# Patient Record
Sex: FEMALE | Race: WHITE | NOT HISPANIC OR LATINO | Employment: OTHER | URBAN - METROPOLITAN AREA
[De-identification: names, ages, dates, MRNs, and addresses within clinical notes are randomized per-mention and may not be internally consistent; named-entity substitution may affect disease eponyms.]

---

## 2022-12-27 ENCOUNTER — APPOINTMENT (OUTPATIENT)
Dept: GENERAL RADIOLOGY | Facility: HOSPITAL | Age: 87
End: 2022-12-27
Payer: MEDICARE

## 2022-12-27 ENCOUNTER — HOSPITAL ENCOUNTER (OUTPATIENT)
Facility: HOSPITAL | Age: 87
Setting detail: OBSERVATION
Discharge: NURSING, REHAB OR LONG-TERM CARE | End: 2023-01-03
Attending: EMERGENCY MEDICINE | Admitting: INTERNAL MEDICINE
Payer: MEDICARE

## 2022-12-27 ENCOUNTER — APPOINTMENT (OUTPATIENT)
Dept: CT IMAGING | Facility: HOSPITAL | Age: 87
End: 2022-12-27
Payer: MEDICARE

## 2022-12-27 DIAGNOSIS — R73.09 ELEVATED GLUCOSE: ICD-10-CM

## 2022-12-27 DIAGNOSIS — E83.52 HYPERCALCEMIA: ICD-10-CM

## 2022-12-27 DIAGNOSIS — E86.0 DEHYDRATION: ICD-10-CM

## 2022-12-27 DIAGNOSIS — R82.90 ABNORMAL URINE FINDING: ICD-10-CM

## 2022-12-27 DIAGNOSIS — Z74.09 DECREASED AMBULATION STATUS: Primary | ICD-10-CM

## 2022-12-27 DIAGNOSIS — R79.89 ELEVATED TSH: ICD-10-CM

## 2022-12-27 DIAGNOSIS — R79.89 LFT ELEVATION: ICD-10-CM

## 2022-12-27 DIAGNOSIS — R53.1 WEAKNESS: ICD-10-CM

## 2022-12-27 DIAGNOSIS — L89.302 PRESSURE INJURY OF BUTTOCK, STAGE 2, UNSPECIFIED LATERALITY: ICD-10-CM

## 2022-12-27 DIAGNOSIS — M62.82 NON-TRAUMATIC RHABDOMYOLYSIS: ICD-10-CM

## 2022-12-27 LAB
ALBUMIN SERPL-MCNC: 4.2 G/DL (ref 3.5–5.2)
ALBUMIN/GLOB SERPL: 1.4 G/DL
ALP SERPL-CCNC: 199 U/L (ref 39–117)
ALT SERPL W P-5'-P-CCNC: 143 U/L (ref 1–33)
ANION GAP SERPL CALCULATED.3IONS-SCNC: 14 MMOL/L (ref 5–15)
AST SERPL-CCNC: 119 U/L (ref 1–32)
BACTERIA UR QL AUTO: ABNORMAL /HPF
BASOPHILS # BLD AUTO: 0.02 10*3/MM3 (ref 0–0.2)
BASOPHILS NFR BLD AUTO: 0.2 % (ref 0–1.5)
BILIRUB SERPL-MCNC: 1.1 MG/DL (ref 0–1.2)
BILIRUB UR QL STRIP: ABNORMAL
BUN SERPL-MCNC: 24 MG/DL (ref 8–23)
BUN/CREAT SERPL: 24.5 (ref 7–25)
CALCIUM SPEC-SCNC: 9.9 MG/DL (ref 8.2–9.6)
CHLORIDE SERPL-SCNC: 103 MMOL/L (ref 98–107)
CK SERPL-CCNC: 614 U/L (ref 20–180)
CLARITY UR: ABNORMAL
CO2 SERPL-SCNC: 20 MMOL/L (ref 22–29)
COLOR UR: ABNORMAL
CREAT SERPL-MCNC: 0.98 MG/DL (ref 0.57–1)
CRP SERPL-MCNC: 3.31 MG/DL (ref 0–0.5)
D-LACTATE SERPL-SCNC: 1.8 MMOL/L (ref 0.5–2)
DEPRECATED RDW RBC AUTO: 45.5 FL (ref 37–54)
EGFRCR SERPLBLD CKD-EPI 2021: 51.6 ML/MIN/1.73
EOSINOPHIL # BLD AUTO: 0 10*3/MM3 (ref 0–0.4)
EOSINOPHIL NFR BLD AUTO: 0 % (ref 0.3–6.2)
ERYTHROCYTE [DISTWIDTH] IN BLOOD BY AUTOMATED COUNT: 13.4 % (ref 12.3–15.4)
ERYTHROCYTE [SEDIMENTATION RATE] IN BLOOD: 46 MM/HR (ref 0–30)
FLUAV SUBTYP SPEC NAA+PROBE: NOT DETECTED
FLUBV RNA ISLT QL NAA+PROBE: NOT DETECTED
GLOBULIN UR ELPH-MCNC: 2.9 GM/DL
GLUCOSE SERPL-MCNC: 150 MG/DL (ref 65–99)
GLUCOSE UR STRIP-MCNC: NEGATIVE MG/DL
HCT VFR BLD AUTO: 40.6 % (ref 34–46.6)
HGB BLD-MCNC: 13.7 G/DL (ref 12–15.9)
HGB UR QL STRIP.AUTO: NEGATIVE
HOLD SPECIMEN: NORMAL
HYALINE CASTS UR QL AUTO: ABNORMAL /LPF
IMM GRANULOCYTES # BLD AUTO: 0.05 10*3/MM3 (ref 0–0.05)
IMM GRANULOCYTES NFR BLD AUTO: 0.5 % (ref 0–0.5)
KETONES UR QL STRIP: ABNORMAL
LEUKOCYTE ESTERASE UR QL STRIP.AUTO: ABNORMAL
LYMPHOCYTES # BLD AUTO: 0.7 10*3/MM3 (ref 0.7–3.1)
LYMPHOCYTES NFR BLD AUTO: 7.2 % (ref 19.6–45.3)
MAGNESIUM SERPL-MCNC: 2 MG/DL (ref 1.7–2.3)
MCH RBC QN AUTO: 31.4 PG (ref 26.6–33)
MCHC RBC AUTO-ENTMCNC: 33.7 G/DL (ref 31.5–35.7)
MCV RBC AUTO: 92.9 FL (ref 79–97)
MONOCYTES # BLD AUTO: 0.82 10*3/MM3 (ref 0.1–0.9)
MONOCYTES NFR BLD AUTO: 8.4 % (ref 5–12)
NEUTROPHILS NFR BLD AUTO: 8.14 10*3/MM3 (ref 1.7–7)
NEUTROPHILS NFR BLD AUTO: 83.7 % (ref 42.7–76)
NITRITE UR QL STRIP: NEGATIVE
NRBC BLD AUTO-RTO: 0 /100 WBC (ref 0–0.2)
PH UR STRIP.AUTO: <=5 [PH] (ref 5–8)
PLATELET # BLD AUTO: 235 10*3/MM3 (ref 140–450)
PMV BLD AUTO: 10.4 FL (ref 6–12)
POTASSIUM SERPL-SCNC: 3.6 MMOL/L (ref 3.5–5.2)
PROT SERPL-MCNC: 7.1 G/DL (ref 6–8.5)
PROT UR QL STRIP: ABNORMAL
QT INTERVAL: 438 MS
QTC INTERVAL: 475 MS
RBC # BLD AUTO: 4.37 10*6/MM3 (ref 3.77–5.28)
RBC # UR STRIP: ABNORMAL /HPF
REF LAB TEST METHOD: ABNORMAL
SARS-COV-2 RNA PNL SPEC NAA+PROBE: NOT DETECTED
SODIUM SERPL-SCNC: 137 MMOL/L (ref 136–145)
SP GR UR STRIP: 1.02 (ref 1–1.03)
SQUAMOUS #/AREA URNS HPF: ABNORMAL /HPF
T4 FREE SERPL-MCNC: 0.88 NG/DL (ref 0.93–1.7)
TROPONIN T SERPL-MCNC: <0.01 NG/ML (ref 0–0.03)
TSH SERPL DL<=0.05 MIU/L-ACNC: 5.7 UIU/ML (ref 0.27–4.2)
UROBILINOGEN UR QL STRIP: ABNORMAL
VIT B12 BLD-MCNC: 685 PG/ML (ref 211–946)
WBC # UR STRIP: ABNORMAL /HPF
WBC NRBC COR # BLD: 9.73 10*3/MM3 (ref 3.4–10.8)
WHOLE BLOOD HOLD COAG: NORMAL
WHOLE BLOOD HOLD SPECIMEN: NORMAL
YEAST URNS QL MICRO: ABNORMAL /HPF

## 2022-12-27 PROCEDURE — G0378 HOSPITAL OBSERVATION PER HR: HCPCS

## 2022-12-27 PROCEDURE — 87086 URINE CULTURE/COLONY COUNT: CPT | Performed by: INTERNAL MEDICINE

## 2022-12-27 PROCEDURE — 93005 ELECTROCARDIOGRAM TRACING: CPT

## 2022-12-27 PROCEDURE — 84439 ASSAY OF FREE THYROXINE: CPT | Performed by: INTERNAL MEDICINE

## 2022-12-27 PROCEDURE — 85025 COMPLETE CBC W/AUTO DIFF WBC: CPT | Performed by: EMERGENCY MEDICINE

## 2022-12-27 PROCEDURE — 96365 THER/PROPH/DIAG IV INF INIT: CPT

## 2022-12-27 PROCEDURE — 82607 VITAMIN B-12: CPT | Performed by: INTERNAL MEDICINE

## 2022-12-27 PROCEDURE — 87040 BLOOD CULTURE FOR BACTERIA: CPT | Performed by: INTERNAL MEDICINE

## 2022-12-27 PROCEDURE — 25010000002 CEFTRIAXONE PER 250 MG: Performed by: INTERNAL MEDICINE

## 2022-12-27 PROCEDURE — 99220 PR INITIAL OBSERVATION CARE/DAY 70 MINUTES: CPT | Performed by: INTERNAL MEDICINE

## 2022-12-27 PROCEDURE — 71045 X-RAY EXAM CHEST 1 VIEW: CPT

## 2022-12-27 PROCEDURE — C9803 HOPD COVID-19 SPEC COLLECT: HCPCS

## 2022-12-27 PROCEDURE — 99285 EMERGENCY DEPT VISIT HI MDM: CPT

## 2022-12-27 PROCEDURE — 87636 SARSCOV2 & INF A&B AMP PRB: CPT | Performed by: EMERGENCY MEDICINE

## 2022-12-27 PROCEDURE — 84484 ASSAY OF TROPONIN QUANT: CPT | Performed by: EMERGENCY MEDICINE

## 2022-12-27 PROCEDURE — 83605 ASSAY OF LACTIC ACID: CPT | Performed by: INTERNAL MEDICINE

## 2022-12-27 PROCEDURE — 93005 ELECTROCARDIOGRAM TRACING: CPT | Performed by: EMERGENCY MEDICINE

## 2022-12-27 PROCEDURE — 96372 THER/PROPH/DIAG INJ SC/IM: CPT

## 2022-12-27 PROCEDURE — 84443 ASSAY THYROID STIM HORMONE: CPT | Performed by: EMERGENCY MEDICINE

## 2022-12-27 PROCEDURE — 86140 C-REACTIVE PROTEIN: CPT | Performed by: INTERNAL MEDICINE

## 2022-12-27 PROCEDURE — 81001 URINALYSIS AUTO W/SCOPE: CPT | Performed by: EMERGENCY MEDICINE

## 2022-12-27 PROCEDURE — 82550 ASSAY OF CK (CPK): CPT | Performed by: EMERGENCY MEDICINE

## 2022-12-27 PROCEDURE — 83735 ASSAY OF MAGNESIUM: CPT | Performed by: EMERGENCY MEDICINE

## 2022-12-27 PROCEDURE — 87186 SC STD MICRODIL/AGAR DIL: CPT | Performed by: INTERNAL MEDICINE

## 2022-12-27 PROCEDURE — 87077 CULTURE AEROBIC IDENTIFY: CPT | Performed by: INTERNAL MEDICINE

## 2022-12-27 PROCEDURE — 80053 COMPREHEN METABOLIC PANEL: CPT | Performed by: EMERGENCY MEDICINE

## 2022-12-27 PROCEDURE — 85652 RBC SED RATE AUTOMATED: CPT | Performed by: INTERNAL MEDICINE

## 2022-12-27 PROCEDURE — 25010000002 ENOXAPARIN PER 10 MG: Performed by: INTERNAL MEDICINE

## 2022-12-27 RX ORDER — BISACODYL 5 MG/1
5 TABLET, DELAYED RELEASE ORAL DAILY PRN
Status: DISCONTINUED | OUTPATIENT
Start: 2022-12-27 | End: 2023-01-03 | Stop reason: HOSPADM

## 2022-12-27 RX ORDER — SODIUM CHLORIDE 9 MG/ML
75 INJECTION, SOLUTION INTRAVENOUS CONTINUOUS
Status: ACTIVE | OUTPATIENT
Start: 2022-12-27 | End: 2022-12-28

## 2022-12-27 RX ORDER — DORZOLAMIDE HYDROCHLORIDE AND TIMOLOL MALEATE 20; 5 MG/ML; MG/ML
1 SOLUTION/ DROPS OPHTHALMIC 2 TIMES DAILY
COMMUNITY

## 2022-12-27 RX ORDER — SODIUM CHLORIDE 0.9 % (FLUSH) 0.9 %
10 SYRINGE (ML) INJECTION AS NEEDED
Status: DISCONTINUED | OUTPATIENT
Start: 2022-12-27 | End: 2023-01-03 | Stop reason: HOSPADM

## 2022-12-27 RX ORDER — ACETAMINOPHEN 650 MG/1
650 SUPPOSITORY RECTAL EVERY 4 HOURS PRN
Status: DISCONTINUED | OUTPATIENT
Start: 2022-12-27 | End: 2023-01-03 | Stop reason: HOSPADM

## 2022-12-27 RX ORDER — FLUPHENAZINE HYDROCHLORIDE 5 MG/1
2.5 TABLET ORAL DAILY
COMMUNITY

## 2022-12-27 RX ORDER — SODIUM CHLORIDE 0.9 % (FLUSH) 0.9 %
10 SYRINGE (ML) INJECTION EVERY 12 HOURS SCHEDULED
Status: DISCONTINUED | OUTPATIENT
Start: 2022-12-27 | End: 2023-01-03 | Stop reason: HOSPADM

## 2022-12-27 RX ORDER — SODIUM CHLORIDE 9 MG/ML
125 INJECTION, SOLUTION INTRAVENOUS CONTINUOUS
Status: DISCONTINUED | OUTPATIENT
Start: 2022-12-27 | End: 2022-12-27

## 2022-12-27 RX ORDER — GABAPENTIN 300 MG/1
300 CAPSULE ORAL 3 TIMES DAILY
COMMUNITY

## 2022-12-27 RX ORDER — BISACODYL 10 MG
10 SUPPOSITORY, RECTAL RECTAL DAILY PRN
Status: DISCONTINUED | OUTPATIENT
Start: 2022-12-27 | End: 2023-01-03 | Stop reason: HOSPADM

## 2022-12-27 RX ORDER — SPIRONOLACTONE 25 MG/1
25 TABLET ORAL DAILY
Status: ON HOLD | COMMUNITY
End: 2023-01-03 | Stop reason: SDUPTHER

## 2022-12-27 RX ORDER — ENOXAPARIN SODIUM 100 MG/ML
40 INJECTION SUBCUTANEOUS DAILY
Status: DISCONTINUED | OUTPATIENT
Start: 2022-12-27 | End: 2022-12-28

## 2022-12-27 RX ORDER — ACETAMINOPHEN 325 MG/1
650 TABLET ORAL EVERY 4 HOURS PRN
Status: DISCONTINUED | OUTPATIENT
Start: 2022-12-27 | End: 2023-01-03 | Stop reason: HOSPADM

## 2022-12-27 RX ORDER — PROPYLTHIOURACIL 50 MG/1
50 TABLET ORAL DAILY
COMMUNITY

## 2022-12-27 RX ORDER — ONDANSETRON 2 MG/ML
4 INJECTION INTRAMUSCULAR; INTRAVENOUS EVERY 6 HOURS PRN
Status: DISCONTINUED | OUTPATIENT
Start: 2022-12-27 | End: 2023-01-03 | Stop reason: HOSPADM

## 2022-12-27 RX ORDER — POLYETHYLENE GLYCOL 3350 17 G/17G
17 POWDER, FOR SOLUTION ORAL DAILY PRN
Status: DISCONTINUED | OUTPATIENT
Start: 2022-12-27 | End: 2023-01-03 | Stop reason: HOSPADM

## 2022-12-27 RX ORDER — SODIUM CHLORIDE 9 MG/ML
40 INJECTION, SOLUTION INTRAVENOUS AS NEEDED
Status: DISCONTINUED | OUTPATIENT
Start: 2022-12-27 | End: 2023-01-03 | Stop reason: HOSPADM

## 2022-12-27 RX ORDER — METOPROLOL TARTRATE 100 MG/1
100 TABLET ORAL 2 TIMES DAILY
COMMUNITY

## 2022-12-27 RX ORDER — LISINOPRIL 40 MG/1
40 TABLET ORAL 2 TIMES DAILY
COMMUNITY

## 2022-12-27 RX ORDER — AMOXICILLIN 250 MG
2 CAPSULE ORAL 2 TIMES DAILY
Status: DISCONTINUED | OUTPATIENT
Start: 2022-12-27 | End: 2023-01-03 | Stop reason: HOSPADM

## 2022-12-27 RX ADMIN — ACETAMINOPHEN 650 MG: 325 TABLET ORAL at 15:11

## 2022-12-27 RX ADMIN — SODIUM CHLORIDE 125 ML/HR: 9 INJECTION, SOLUTION INTRAVENOUS at 11:07

## 2022-12-27 RX ADMIN — Medication 10 ML: at 16:26

## 2022-12-27 RX ADMIN — SODIUM CHLORIDE 500 ML: 9 INJECTION, SOLUTION INTRAVENOUS at 11:07

## 2022-12-27 RX ADMIN — SODIUM CHLORIDE 75 ML/HR: 9 INJECTION, SOLUTION INTRAVENOUS at 16:26

## 2022-12-27 RX ADMIN — CEFTRIAXONE 2 G: 2 INJECTION, POWDER, FOR SOLUTION INTRAMUSCULAR; INTRAVENOUS at 16:00

## 2022-12-27 RX ADMIN — ENOXAPARIN SODIUM 40 MG: 40 INJECTION SUBCUTANEOUS at 18:05

## 2022-12-27 NOTE — H&P
[facility] Medicine Services  HISTORY AND PHYSICAL    Patient Name: Corie Kessler  : 1922  MRN: [id number]  Primary Care Physician: Sweta Nagel MD  Date of admission: 2022      Subjective   Subjective     Chief Complaint:  Weakness    HPI:  Corie Kessler is a 89 y.o. female with past medical history of essential hypertension, not on medications at home, who lives alone and pretty much independent with her ADLs, using a walker to ambulate and lives close to her daughter who presented to the hospital brought by EMS for extreme weakness.    Around 5 PM yesterday, she went to use the toilet and after she finished urinating, she could not get up because of extreme weakness in her lower extremities.  She tried multiple times but could not stand up.  She did not want to use her [employer] button because she did not want her daughter to get out and come in the cold weather so she spent the whole night sitting in the toilet and eventually pressed her button in the morning.    She cannot tell me if she has dysuria but she does have urgency.  Denies any fever chills.  Denies any cough.  Denies any chest pain    In ER, blood work-up was unremarkable.  Vital stables.  Urine analysis positive for bacteriuria    Review of Systems   General : no fevers, chills,++ weakness  CVS: No chest pain, palpitations  Respiratory: No cough, dyspnea  GI: No N/V/D, abd pain  10 point review of systems is negative except for what is mentioned in HPI    Personal History     Past Medical History:   Diagnosis Date   • Hypertension              History reviewed. No pertinent surgical history.    Family History: Reviewed and found to be noncontributory to the acute presenting illness      Social History:  reports that she has never smoked. She does not have any smokeless tobacco history on file. She reports that she does not drink alcohol and does not use drugs.  Social History     Social History Narrative    • Not on file       Medications:  Available home medication information reviewed.  (Not in a hospital admission)      No Known Allergies    Objective   Objective     Vital Signs:   Temp:  [97.9 °F (36.6 °C)] 97.9 °F (36.6 °C)  Heart Rate:  [66-73] 66  Resp:  [16] 16  BP: (105-125)/(44-59) 109/46       Physical Exam   General: Chronically ill looking, not in distress, conversant and cooperative  Head: Atraumatic and normocephalic  Eyes: No Icterus. No pallor  Ears:  Ears appear intact with no abnormalities noted  Throat: No oral lesions, no thrush  Neck: Supple, trachea midline  Lungs: Clear to auscultation bilaterally, equal air entry, no wheezing or crackles  Heart:  Normal S1 and S2, no murmur, no gallop, No JVD, no lower extremity swelling  Abdomen:  Soft, no tenderness, no organomegaly, normal bowel sounds, no organomegaly  Extremities: pulses equal bilaterally  Skin: No bleeding, bruising or rash, normal skin turgor and elasticity  Neurologic: Cranial nerves appear intact with no evidence of facial asymmetry, normal motor and sensory functions in all 4 extremities  Psych: Alert and oriented x 3, normal mood    Result Review:  I have personally reviewed the results from the time of this admission to 12/27/2022 12:19 EST and agree with these findings:  [x]  Laboratory list / accordion  [x]  Microbiology  [x]  Radiology  []  EKG/Telemetry   []  Cardiology/Vascular   []  Pathology  []  Old records      LAB RESULTS:      Lab 12/27/22  1031   WBC 9.73   HEMOGLOBIN 13.7   HEMATOCRIT 40.6   PLATELETS 235   NEUTROS ABS 8.14*   IMMATURE GRANS (ABS) 0.05   LYMPHS ABS 0.70   MONOS ABS 0.82   EOS ABS 0.00   MCV 92.9         Lab 12/27/22  1031   SODIUM 137   POTASSIUM 3.6   CHLORIDE 103   CO2 20.0*   ANION GAP 14.0   BUN 24*   CREATININE 0.98   EGFR 51.6*   GLUCOSE 150*   CALCIUM 9.9*   MAGNESIUM 2.0   TSH 5.700*         Lab 12/27/22  1031   TOTAL PROTEIN 7.1   ALBUMIN 4.2   GLOBULIN 2.9   ALT (SGPT) 143*   AST (SGOT) 119*    BILIRUBIN 1.1   ALK PHOS 199*         Lab 12/27/22  1031   TROPONIN T <0.010                 UA    Urinalysis 12/27/22 12/27/22    1103 1103   Squamous Epithelial Cells, UA  0-2   Specific Gravity, UA 1.020    Ketones, UA Trace (A)    Blood, UA Negative    Leukocytes, UA Small (1+) (A)    Nitrite, UA Negative    RBC, UA  7-12 (A)   WBC, UA  6-12 (A)   Bacteria, UA  4+ (A)   (A) Abnormal value              Microbiology Results (last 10 days)     Procedure Component Value - Date/Time    COVID-19 and FLU A/B PCR - Swab, Nasopharynx [[id number]]  (Normal) Collected: 12/27/22 1107    Lab Status: Final result Specimen: Swab from Nasopharynx Updated: 12/27/22 1207     COVID19 Not Detected     Influenza A PCR Not Detected     Influenza B PCR Not Detected    Narrative:      Fact sheet for providers: https://www.fda.gov/media/834340/download    Fact sheet for patients: https://www.fda.gov/media/058829/download    Test performed by PCR.          XR Chest 1 View    Result Date: 12/27/2022  DATE OF EXAM: 12/27/2022 11:09 AM  PROCEDURE: XR CHEST 1 VW-  INDICATIONS: Weak/Dizzy/AMS triage protocol  COMPARISON: 3/30/2010 portable chest radiograph  TECHNIQUE: Single radiographic AP view of the chest was obtained.  FINDINGS: Heart is normal in size. Vasculature appears normal. Mild coarsening of pulmonary interstitial markings may be chronic senescent change, given patient's age. There may be a small focus of atelectasis in the right lung base but no other potential focal lung disease is seen. No effusion or pneumothorax is seen. 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 No acute bony abnormalities are appreciated.      Impression: Suspected small focus of left basilar atelectasis, seen superimposed on the left heart shadow. Mild nonspecific pulmonary interstitial changes elsewhere.  This report was finalized on 12/27/2022 11:52 AM by Dr. Jeff Jackson MD.            Assessment & Plan   Assessment & Plan     Active Hospital Problems     Diagnosis  POA   • **Decreased ambulation status [Z74.09]  Yes       Summary:  Corie Kessler is a 89 y.o. female with past medical history of essential hypertension, not on medications at home, who lives alone and pretty much independent with her ADLs, using a walker to ambulate and lives close to her daughter who presented to the hospital brought by EMS for extreme weakness.    Assessment and plan:  Severe weakness  Elevated CK  Acute UTI, POA  · Patient developed severe weakness while sitting on the toilet and could not stand up so she called for help  · Her weakness could be part of her general debility with her old age but also could be related to her UTI  · Will start antibiotic therapy with Rocephin and follow urine culture  · Probiotics  · Gentle fluids  · Obtain CT head  · Her CK is elevated but not at the level that I can call it rhabdomyolysis.  IV fluids should help.  Check CK in the a.m.  · Check B12, TSH, CRP and sedimentation rate  · Consult PT and OT.  Likely will need some sort of rehab        DVT prophylaxis: Lovenox      CODE STATUS: DNR  There are no questions and answers to display.       Expected Discharge 12/29/2022       Bharath Salvador MD  12/27/22

## 2022-12-27 NOTE — CASE MANAGEMENT/SOCIAL WORK
Discharge Planning Assessment  [facility]     Patient Name: Corie Kessler  MRN: [id number]  Today's Date: 12/27/2022    Admit Date: 12/27/2022    Plan: IDP   Discharge Needs Assessment     Row Name 12/27/22 1249       Living Environment    People in Home alone    Current Living Arrangements home    Primary Care Provided by self    Provides Primary Care For no one    Family Caregiver if Needed child(delia), adult    Quality of Family Relationships helpful;involved       Transition Planning    Transportation Anticipated family or friend will provide       Discharge Needs Assessment    Equipment Currently Used at Home walker, standard    Concerns to be Addressed denies needs/concerns at this time               Discharge Plan     Row Name 12/27/22 1249       Plan    Plan IDP    Plan Comments MSW met with Pt at bedside to complete IDP. Pt lives alone in [place]. Pt confirmed demographics and reports PCP is Dr. Nagel. Pt has [employer] Medicare. Pt is moderate with ADL’s. Pt reports she has walker and no HH. Pt reports she has different people come out and help her, unsure if Pt has HH. Pt’s preferred pharmacy is [email or web address]. Pt reports her daughter will be able to transport when medically ready. Pt denied needs or concerns. CM will continue to follow.    Final Discharge Disposition Code 30 - still a patient              Continued Care and Services - Admitted Since 12/27/2022    Coordination has not been started for this encounter.          Demographic Summary     Row Name 12/27/22 1248       General Information    Admission Type observation    Arrived From home    Referral Source admission list;emergency department    Reason for Consult discharge planning    Preferred Language English               Functional Status     Row Name 12/27/22 1248       Functional Status    Usual Activity Tolerance good    Current Activity Tolerance good       Functional Status, IADL    Medications independent    Meal Preparation independent     Housekeeping independent    Laundry independent    Shopping independent                         ANDREA Soto

## 2022-12-27 NOTE — ED PROVIDER NOTES
Subjective   History of Present Illness  This is a delightful 89-year-old female who is accompanied by her daughter.  At her baseline she lives independently and uses a walker to get room to room in the house but seldom leaves the house.  Gabriella Nagel at [facility] at the [facility] in clinic is her PCP.  She has somebody check on her every day at the house and her daughter lives very close.  She has had no hospitalizations in the past several years and really is on a fairly simple medication regimen including Neurontin for neuropathy in her hands and probably feet along with blood pressure medicine.    She has no history of cancers, recent falls, fractured bones, or heart issues that she knows of.  She is not a diabetic.    She is brought to the emergency room today by EMS after an episode where she went to the toilet yesterday at about 5:00 and sat down to urinate and could not get off the toilet as her legs were very weak.  She spent the entire night on the toilet and this morning finally activated her [facility] button and EMS arrived and brought the patient to the emergency department for further evaluation.    Outside of a sore but she has few complaints now she is weaker than her baseline.  She has not been ill recently.  There is been no fevers or chills.  No cough.  Bowel movements and urine have been okay.  Appetites been fair.  No recent medication changes or anything to precipitate this event that I can think of that she can think of.        All other systems reviewed and are negative except as noted above.        Review of Systems   All other systems reviewed and are negative.      Past Medical History:   Diagnosis Date   • Hypertension        No Known Allergies    History reviewed. No pertinent surgical history.    History reviewed. No pertinent family history.    Social History     Socioeconomic History   • Marital status:    Tobacco Use   • Smoking status: Never   Substance and Sexual Activity   • Alcohol use: Never   •  Drug use: Never           Objective   Physical Exam  Vitals and nursing note reviewed.   Constitutional:       Comments: This is a very pleasant 89-year-old who is alert and oriented GCS is 15 she is quite well-preserved.  He is thin.   HENT:      Head: Normocephalic and atraumatic.      Right Ear: External ear normal.      Left Ear: External ear normal.      Nose: Nose normal.      Mouth/Throat:      Mouth: Mucous membranes are moist.      Pharynx: Oropharynx is clear.   Eyes:      Extraocular Movements: Extraocular movements intact.      Conjunctiva/sclera: Conjunctivae normal.      Pupils: Pupils are equal, round, and reactive to light.   Neck:      Vascular: No carotid bruit.   Cardiovascular:      Rate and Rhythm: Normal rate and regular rhythm.      Pulses: Normal pulses.      Heart sounds: Normal heart sounds.      Comments: Soft 1/6 systolic murmur at the base.  Pulmonary:      Effort: Pulmonary effort is normal.   Abdominal:      General: Abdomen is flat. Bowel sounds are normal. There is no distension.      Palpations: Abdomen is soft. There is no mass.      Tenderness: There is no abdominal tenderness.      Comments: No femoral adenopathy   Genitourinary:     Comments: Buttocks show grade 1 breakdown to grade 2 breakdown in the area toilet seat.  This was cleansed and we will put DuoDERM on it  Musculoskeletal:      Cervical back: Normal range of motion and neck supple. No rigidity or tenderness.      Comments: Equal pulses [place] arthritic changes of the hands.  She has 1+ edema of the feet which is unusual for her probably because she was sitting on the toilet all night but there are no venous cords there is 2/4 pulses in her feet no tissue loss she has some chronic toe changes but nothing looks cellulitic or fractured.   Lymphadenopathy:      Cervical: No cervical adenopathy.   Skin:     General: Skin is warm and dry.      Capillary Refill: Capillary refill takes less than 2 seconds.   Neurological:       Mental Status: She is alert.      Comments: Face symmetric, voice soft, tongue midline.  Vision, hearing, and speech are preserved.  She has moderate global weakness without focality.  Her knee jerk reflexes are 1+ bilaterally.  She has some mild neuropathy in her hands and feet         Procedures           ED Course            Recent Results (from the past 24 hour(s))   ECG 12 Lead ED Triage Standing Order; Weak / Dizzy / AMS    Collection Time: 12/27/22 10:27 AM   Result Value Ref Range    QT Interval 438 ms    QTC Interval 475 ms   Comprehensive Metabolic Panel    Collection Time: 12/27/22 10:31 AM    Specimen: Blood   Result Value Ref Range    Glucose 150 (H) 65 - 99 mg/dL    BUN 24 (H) 8 - 23 mg/dL    Creatinine 0.98 0.57 - 1.00 mg/dL    Sodium 137 136 - 145 mmol/L    Potassium 3.6 3.5 - 5.2 mmol/L    Chloride 103 98 - 107 mmol/L    CO2 20.0 (L) 22.0 - 29.0 mmol/L    Calcium 9.9 (H) 8.2 - 9.6 mg/dL    Total Protein 7.1 6.0 - 8.5 g/dL    Albumin 4.2 3.5 - 5.2 g/dL    ALT (SGPT) 143 (H) 1 - 33 U/L    AST (SGOT) 119 (H) 1 - 32 U/L    Alkaline Phosphatase 199 (H) 39 - 117 U/L    Total Bilirubin 1.1 0.0 - 1.2 mg/dL    Globulin 2.9 gm/dL    A/G Ratio 1.4 g/dL    BUN/Creatinine Ratio 24.5 7.0 - 25.0    Anion Gap 14.0 5.0 - 15.0 mmol/L    eGFR 51.6 (L) >60.0 mL/min/1.73   Troponin    Collection Time: 12/27/22 10:31 AM    Specimen: Blood   Result Value Ref Range    Troponin T <0.010 0.000 - 0.030 ng/mL   Magnesium    Collection Time: 12/27/22 10:31 AM    Specimen: Blood   Result Value Ref Range    Magnesium 2.0 1.7 - 2.3 mg/dL   Green Top (Gel)    Collection Time: 12/27/22 10:31 AM   Result Value Ref Range    Extra Tube Hold for add-ons.    Lavender Top    Collection Time: 12/27/22 10:31 AM   Result Value Ref Range    Extra Tube hold for add-on    Gold Top - SST    Collection Time: 12/27/22 10:31 AM   Result Value Ref Range    Extra Tube Hold for add-ons.    Gray Top    Collection Time: 12/27/22 10:31 AM   Result  Value Ref Range    Extra Tube Hold for add-ons.    Light Blue Top    Collection Time: 12/27/22 10:31 AM   Result Value Ref Range    Extra Tube Hold for add-ons.    CBC Auto Differential    Collection Time: 12/27/22 10:31 AM    Specimen: Blood   Result Value Ref Range    WBC 9.73 3.40 - 10.80 10*3/mm3    RBC 4.37 3.77 - 5.28 10*6/mm3    Hemoglobin 13.7 12.0 - 15.9 g/dL    Hematocrit 40.6 34.0 - 46.6 %    MCV 92.9 79.0 - 97.0 fL    MCH 31.4 26.6 - 33.0 pg    MCHC 33.7 31.5 - 35.7 g/dL    RDW 13.4 12.3 - 15.4 %    RDW-SD 45.5 37.0 - 54.0 fl    MPV 10.4 6.0 - 12.0 fL    Platelets 235 140 - 450 10*3/mm3    Neutrophil % 83.7 (H) 42.7 - 76.0 %    Lymphocyte % 7.2 (L) 19.6 - 45.3 %    Monocyte % 8.4 5.0 - 12.0 %    Eosinophil % 0.0 (L) 0.3 - 6.2 %    Basophil % 0.2 0.0 - 1.5 %    Immature Grans % 0.5 0.0 - 0.5 %    Neutrophils, Absolute 8.14 (H) 1.70 - 7.00 10*3/mm3    Lymphocytes, Absolute 0.70 0.70 - 3.10 10*3/mm3    Monocytes, Absolute 0.82 0.10 - 0.90 10*3/mm3    Eosinophils, Absolute 0.00 0.00 - 0.40 10*3/mm3    Basophils, Absolute 0.02 0.00 - 0.20 10*3/mm3    Immature Grans, Absolute 0.05 0.00 - 0.05 10*3/mm3    nRBC 0.0 0.0 - 0.2 /100 WBC   TSH    Collection Time: 12/27/22 10:31 AM    Specimen: Blood   Result Value Ref Range    TSH 5.700 (H) 0.270 - 4.200 uIU/mL   CK    Collection Time: 12/27/22 10:31 AM    Specimen: Blood   Result Value Ref Range    Creatine Kinase 614 (H) 20 - 180 U/L   Lactic Acid, Plasma    Collection Time: 12/27/22 10:31 AM    Specimen: Blood   Result Value Ref Range    Lactate 1.8 0.5 - 2.0 mmol/L   Vitamin B12    Collection Time: 12/27/22 10:31 AM    Specimen: Blood   Result Value Ref Range    Vitamin B-12 685 211 - 946 pg/mL   T4, Free    Collection Time: 12/27/22 10:31 AM    Specimen: Blood   Result Value Ref Range    Free T4 0.88 (L) 0.93 - 1.70 ng/dL   C-reactive Protein    Collection Time: 12/27/22 10:31 AM    Specimen: Blood   Result Value Ref Range    C-Reactive Protein 3.31 (H) 0.00 -  0.50 mg/dL   Sedimentation Rate    Collection Time: 12/27/22 10:31 AM    Specimen: Blood   Result Value Ref Range    Sed Rate 46 (H) 0 - 30 mm/hr   Urinalysis With Microscopic If Indicated (No Culture) - Urine, Clean Catch    Collection Time: 12/27/22 11:03 AM    Specimen: Urine, Clean Catch   Result Value Ref Range    Color, UA Dark Yellow (A) Yellow, Straw    Appearance, UA Turbid (A) Clear    pH, UA <=5.0 5.0 - 8.0    Specific Gravity, UA 1.020 1.001 - 1.030    Glucose, UA Negative Negative    Ketones, UA Trace (A) Negative    Bilirubin, UA Small (1+) (A) Negative    Blood, UA Negative Negative    Protein,  mg/dL (2+) (A) Negative    Leuk Esterase, UA Small (1+) (A) Negative    Nitrite, UA Negative Negative    Urobilinogen, UA 2.0 E.U./dL (A) 0.2 - 1.0 E.U./dL   Urinalysis, Microscopic Only - Urine, Clean Catch    Collection Time: 12/27/22 11:03 AM    Specimen: Urine, Clean Catch   Result Value Ref Range    RBC, UA 7-12 (A) None Seen, 0-2 /HPF    WBC, UA 6-12 (A) None Seen, 0-2 /HPF    Bacteria, UA 4+ (A) None Seen, Trace /HPF    Squamous Epithelial Cells, UA 0-2 None Seen, 0-2 /HPF    Yeast, UA Small/1+ Budding Yeast None Seen /HPF    Hyaline Casts, UA 0-6 0 - 6 /LPF    Methodology Manual Light Microscopy    COVID-19 and FLU A/B PCR - Swab, Nasopharynx    Collection Time: 12/27/22 11:07 AM    Specimen: Nasopharynx; Swab   Result Value Ref Range    COVID19 Not Detected Not Detected - Ref. Range    Influenza A PCR Not Detected Not Detected    Influenza B PCR Not Detected Not Detected     Note: In addition to lab results from this visit, the labs listed above may include labs taken at another facility or during a different encounter within the last 24 hours. Please correlate lab times with ED admission and discharge times for further clarification of the services performed during this visit.    XR Chest 1 View   Final Result   Suspected small focus of left basilar atelectasis, seen superimposed on   the left  heart shadow. Mild nonspecific pulmonary interstitial changes   elsewhere.       This report was finalized on 12/27/2022 11:52 AM by Dr. Jeff Jackson MD.          CT Head Without Contrast    (Results Pending)     Vitals:    12/27/22 1400 12/27/22 1439 12/27/22 1650 12/27/22 1954   BP: 124/55 141/52 125/53 128/52   BP Location:  Right arm Right arm Right arm   Patient Position:  Lying Lying Lying   Pulse: 66 68 70 64   Resp:  16 16 16   Temp:  98 °F (36.7 °C) 97.6 °F (36.4 °C) 97.5 °F (36.4 °C)   TempSrc:  Oral Oral Oral   SpO2: 99% 99% 100% 100%   Weight:       Height:         Medications   sodium chloride 0.9 % flush 10 mL (has no administration in time range)   sodium chloride 0.9 % infusion (75 mL/hr Intravenous Currently Infusing 12/27/22 1803)   cefTRIAXone (ROCEPHIN) 2 g/100 mL 0.9% NS IVPB (MBP) (0 g Intravenous Stopped 12/27/22 1700)   sodium chloride 0.9 % flush 10 mL (10 mL Intravenous Not Given 12/27/22 2033)   sodium chloride 0.9 % flush 10 mL (has no administration in time range)   sodium chloride 0.9 % infusion 40 mL (has no administration in time range)   Enoxaparin Sodium (LOVENOX) syringe 40 mg (40 mg Subcutaneous Given 12/27/22 1805)   acetaminophen (TYLENOL) tablet 650 mg (650 mg Oral Given 12/27/22 1511)     Or   acetaminophen (TYLENOL) suppository 650 mg ( Rectal Not Given:  See Alt 12/27/22 1511)   sennosides-docusate (PERICOLACE) 8.6-50 MG per tablet 2 tablet (2 tablets Oral Not Given 12/27/22 2033)     And   polyethylene glycol (MIRALAX) packet 17 g (has no administration in time range)     And   bisacodyl (DULCOLAX) EC tablet 5 mg (has no administration in time range)     And   bisacodyl (DULCOLAX) suppository 10 mg (has no administration in time range)   ondansetron (ZOFRAN) injection 4 mg (has no administration in time range)   Influenza Vac High-Dose Quad (FLUZONE HIGH DOSE) injection 0.7 mL (has no administration in time range)   sodium chloride 0.9 % bolus 500 mL (500 mL Intravenous New  Bag 12/27/22 1107)     ECG/EMG Results (last 24 hours)     Procedure Component Value Units Date/Time    ECG 12 Lead ED Triage Standing Order; Weak / Dizzy / AMS [[id number]] Collected: 12/27/22 1027     Updated: 12/27/22 1027     QT Interval 438 ms      QTC Interval 475 ms     Narrative:      Test Reason : ED Triage Standing Order~  Blood Pressure :   */*   mmHG  Vent. Rate :  71 BPM     Atrial Rate :  71 BPM     P-R Int : 156 ms          QRS Dur : 102 ms      QT Int : 438 ms       P-R-T Axes :  59  -2  86 degrees     QTc Int : 475 ms    ** Poor data quality, interpretation may be adversely affected  Normal sinus rhythm  Left ventricular hypertrophy with repolarization abnormality  Abnormal ECG  When compared with ECG of 05-APR-2010 04:39,  QRS duration has increased  Non-specific change in ST segment in Anterior leads  T wave inversion no longer evident in Anterior leads  T wave inversion now evident in Lateral leads    Referred By: FÁTIMA           Confirmed By:         ECG 12 Lead ED Triage Standing Order; Weak / Dizzy / AMS   Final Result   Test Reason : ED Triage Standing Order~   Blood Pressure :   */*   mmHG   Vent. Rate :  71 BPM     Atrial Rate :  71 BPM      P-R Int : 156 ms          QRS Dur : 102 ms       QT Int : 438 ms       P-R-T Axes :  59  -2  86 degrees      QTc Int : 475 ms      ** Poor data quality, interpretation may be adversely affected   Normal sinus rhythm   Left ventricular hypertrophy with repolarization abnormality   Abnormal ECG   When compared with ECG of 05-APR-2010 04:39,   QRS duration has increased   Non-specific change in ST segment in Anterior leads   T wave inversion no longer evident in Anterior leads      Confirmed by JANELL PIERRE, NOEMÍ ([id number]) on 12/27/2022 11:05:48 AM      Referred By: FÁTIMA           Confirmed By: NOEMÍ MACIEL MD                                          MDM  Number of Diagnoses or Management Options  Abnormal urine finding  Decreased ambulation  status  Dehydration  Elevated glucose  Elevated TSH  Hypercalcemia  LFT elevation  Non-traumatic rhabdomyolysis  Pressure injury of buttock, stage 2, unspecified laterality (HCC)  Weakness  Diagnosis management comments:       I have reviewed all available studies at the bedside with the patient and daughter at the bedside.  She is remarkably well-preserved for 89 but the episode of weakness and inability to get off the toilet that is left her with pressure ulcers on her buttocks likely due to senescence also she has hypercalcemia and mild elevation of TSH which may be contributing.    Patient was hydrated here.  We placed DuoDERM on her buttocks.  I think she needs admission for therapy and hopefully she will regain the ability to ambulate again.  Patient and her family quite realistic they really would like her to go home and provide more support for her at home if she requires it.    Begin gently hydrating the patient.  I spoke Dr. Gottlieb, on-call hospital medicine, her colleagues will admit the patient.    Are agreeable with the plan       Amount and/or Complexity of Data Reviewed  Clinical lab tests: reviewed  Tests in the radiology section of CPT®: reviewed  Tests in the medicine section of CPT®: reviewed        Final diagnoses:   Decreased ambulation status   Non-traumatic rhabdomyolysis   Hypercalcemia   Dehydration   LFT elevation   Weakness   Abnormal urine finding   Elevated TSH   Pressure injury of buttock, stage 2, unspecified laterality (HCC)   Elevated glucose       ED Disposition  ED Disposition     ED Disposition   Decision to Admit    Condition   --    Comment   Level of Care: Telemetry [5]   Diagnosis: Decreased ambulation status [[id number]]               No follow-up provider specified.       Medication List      No changes were made to your prescriptions during this visit.          Woody Castro MD  12/27/22 1439

## 2022-12-28 ENCOUNTER — APPOINTMENT (OUTPATIENT)
Dept: CT IMAGING | Facility: HOSPITAL | Age: 87
End: 2022-12-28
Payer: MEDICARE

## 2022-12-28 PROBLEM — N39.0 UTI (URINARY TRACT INFECTION), BACTERIAL: Status: ACTIVE | Noted: 2022-12-28

## 2022-12-28 PROBLEM — A49.9 UTI (URINARY TRACT INFECTION), BACTERIAL: Status: ACTIVE | Noted: 2022-12-28

## 2022-12-28 PROBLEM — I10 HYPERTENSION: Chronic | Status: ACTIVE | Noted: 2022-12-28

## 2022-12-28 LAB
ANION GAP SERPL CALCULATED.3IONS-SCNC: 11 MMOL/L (ref 5–15)
BASOPHILS # BLD AUTO: 0.05 10*3/MM3 (ref 0–0.2)
BASOPHILS NFR BLD AUTO: 0.9 % (ref 0–1.5)
BUN SERPL-MCNC: 19 MG/DL (ref 8–23)
BUN/CREAT SERPL: 26.8 (ref 7–25)
CALCIUM SPEC-SCNC: 8.8 MG/DL (ref 8.2–9.6)
CHLORIDE SERPL-SCNC: 110 MMOL/L (ref 98–107)
CK SERPL-CCNC: 323 U/L (ref 20–180)
CO2 SERPL-SCNC: 18 MMOL/L (ref 22–29)
CREAT SERPL-MCNC: 0.71 MG/DL (ref 0.57–1)
DEPRECATED RDW RBC AUTO: 48.2 FL (ref 37–54)
EGFRCR SERPLBLD CKD-EPI 2021: 76 ML/MIN/1.73
EOSINOPHIL # BLD AUTO: 0.2 10*3/MM3 (ref 0–0.4)
EOSINOPHIL NFR BLD AUTO: 3.6 % (ref 0.3–6.2)
ERYTHROCYTE [DISTWIDTH] IN BLOOD BY AUTOMATED COUNT: 14 % (ref 12.3–15.4)
GLUCOSE SERPL-MCNC: 99 MG/DL (ref 65–99)
HCT VFR BLD AUTO: 35.5 % (ref 34–46.6)
HGB BLD-MCNC: 11.8 G/DL (ref 12–15.9)
IMM GRANULOCYTES # BLD AUTO: 0.03 10*3/MM3 (ref 0–0.05)
IMM GRANULOCYTES NFR BLD AUTO: 0.5 % (ref 0–0.5)
LYMPHOCYTES # BLD AUTO: 0.96 10*3/MM3 (ref 0.7–3.1)
LYMPHOCYTES NFR BLD AUTO: 17.1 % (ref 19.6–45.3)
MAGNESIUM SERPL-MCNC: 2.2 MG/DL (ref 1.7–2.3)
MCH RBC QN AUTO: 31.1 PG (ref 26.6–33)
MCHC RBC AUTO-ENTMCNC: 33.2 G/DL (ref 31.5–35.7)
MCV RBC AUTO: 93.7 FL (ref 79–97)
MONOCYTES # BLD AUTO: 0.54 10*3/MM3 (ref 0.1–0.9)
MONOCYTES NFR BLD AUTO: 9.6 % (ref 5–12)
NEUTROPHILS NFR BLD AUTO: 3.82 10*3/MM3 (ref 1.7–7)
NEUTROPHILS NFR BLD AUTO: 68.3 % (ref 42.7–76)
NRBC BLD AUTO-RTO: 0 /100 WBC (ref 0–0.2)
PHOSPHATE SERPL-MCNC: 3.6 MG/DL (ref 2.5–4.5)
PLATELET # BLD AUTO: 188 10*3/MM3 (ref 140–450)
PMV BLD AUTO: 10.4 FL (ref 6–12)
POTASSIUM SERPL-SCNC: 3.7 MMOL/L (ref 3.5–5.2)
RBC # BLD AUTO: 3.79 10*6/MM3 (ref 3.77–5.28)
SODIUM SERPL-SCNC: 139 MMOL/L (ref 136–145)
WBC NRBC COR # BLD: 5.6 10*3/MM3 (ref 3.4–10.8)

## 2022-12-28 PROCEDURE — G0378 HOSPITAL OBSERVATION PER HR: HCPCS

## 2022-12-28 PROCEDURE — 97530 THERAPEUTIC ACTIVITIES: CPT

## 2022-12-28 PROCEDURE — 97530 THERAPEUTIC ACTIVITIES: CPT | Performed by: PHYSICAL THERAPIST

## 2022-12-28 PROCEDURE — 80048 BASIC METABOLIC PNL TOTAL CA: CPT | Performed by: INTERNAL MEDICINE

## 2022-12-28 PROCEDURE — 97165 OT EVAL LOW COMPLEX 30 MIN: CPT

## 2022-12-28 PROCEDURE — 82550 ASSAY OF CK (CPK): CPT | Performed by: INTERNAL MEDICINE

## 2022-12-28 PROCEDURE — 25010000002 CEFTRIAXONE PER 250 MG: Performed by: INTERNAL MEDICINE

## 2022-12-28 PROCEDURE — 25010000002 ENOXAPARIN PER 10 MG: Performed by: INTERNAL MEDICINE

## 2022-12-28 PROCEDURE — 97162 PT EVAL MOD COMPLEX 30 MIN: CPT | Performed by: PHYSICAL THERAPIST

## 2022-12-28 PROCEDURE — 96366 THER/PROPH/DIAG IV INF ADDON: CPT

## 2022-12-28 PROCEDURE — 84100 ASSAY OF PHOSPHORUS: CPT | Performed by: INTERNAL MEDICINE

## 2022-12-28 PROCEDURE — 97535 SELF CARE MNGMENT TRAINING: CPT

## 2022-12-28 PROCEDURE — 83735 ASSAY OF MAGNESIUM: CPT | Performed by: INTERNAL MEDICINE

## 2022-12-28 PROCEDURE — 70450 CT HEAD/BRAIN W/O DYE: CPT

## 2022-12-28 PROCEDURE — 85025 COMPLETE CBC W/AUTO DIFF WBC: CPT | Performed by: INTERNAL MEDICINE

## 2022-12-28 PROCEDURE — 99225 PR SBSQ OBSERVATION CARE/DAY 25 MINUTES: CPT | Performed by: INTERNAL MEDICINE

## 2022-12-28 PROCEDURE — 96372 THER/PROPH/DIAG INJ SC/IM: CPT

## 2022-12-28 RX ORDER — GABAPENTIN 300 MG/1
300 CAPSULE ORAL 3 TIMES DAILY
Status: DISCONTINUED | OUTPATIENT
Start: 2022-12-28 | End: 2023-01-03 | Stop reason: HOSPADM

## 2022-12-28 RX ORDER — HYDRALAZINE HYDROCHLORIDE 20 MG/ML
10 INJECTION INTRAMUSCULAR; INTRAVENOUS ONCE
Status: COMPLETED | OUTPATIENT
Start: 2022-12-29 | End: 2022-12-29

## 2022-12-28 RX ORDER — ENOXAPARIN SODIUM 100 MG/ML
30 INJECTION SUBCUTANEOUS EVERY 24 HOURS
Status: DISCONTINUED | OUTPATIENT
Start: 2022-12-28 | End: 2023-01-03 | Stop reason: HOSPADM

## 2022-12-28 RX ORDER — FLUPHENAZINE HYDROCHLORIDE 5 MG/1
2.5 TABLET ORAL DAILY
Status: DISCONTINUED | OUTPATIENT
Start: 2022-12-28 | End: 2023-01-03 | Stop reason: HOSPADM

## 2022-12-28 RX ORDER — LISINOPRIL 5 MG/1
5 TABLET ORAL
Status: DISCONTINUED | OUTPATIENT
Start: 2022-12-28 | End: 2022-12-29

## 2022-12-28 RX ORDER — CASTOR OIL AND BALSAM, PERU 788; 87 MG/G; MG/G
1 OINTMENT TOPICAL EVERY 12 HOURS SCHEDULED
Status: DISCONTINUED | OUTPATIENT
Start: 2022-12-28 | End: 2023-01-03 | Stop reason: HOSPADM

## 2022-12-28 RX ADMIN — CEFTRIAXONE 2 G: 2 INJECTION, POWDER, FOR SOLUTION INTRAMUSCULAR; INTRAVENOUS at 11:34

## 2022-12-28 RX ADMIN — CASTOR OIL AND BALSAM, PERU 1 APPLICATION: 788; 87 OINTMENT TOPICAL at 15:41

## 2022-12-28 RX ADMIN — GABAPENTIN 300 MG: 300 CAPSULE ORAL at 11:34

## 2022-12-28 RX ADMIN — LISINOPRIL 5 MG: 5 TABLET ORAL at 13:59

## 2022-12-28 RX ADMIN — Medication 10 ML: at 21:57

## 2022-12-28 RX ADMIN — Medication 10 ML: at 11:35

## 2022-12-28 RX ADMIN — METOPROLOL TARTRATE 25 MG: 25 TABLET, FILM COATED ORAL at 21:57

## 2022-12-28 RX ADMIN — CASTOR OIL AND BALSAM, PERU 1 APPLICATION: 788; 87 OINTMENT TOPICAL at 21:57

## 2022-12-28 RX ADMIN — FLUPHENAZINE HYDROCHLORIDE 2.5 MG: 5 TABLET, FILM COATED ORAL at 11:33

## 2022-12-28 RX ADMIN — METOPROLOL TARTRATE 25 MG: 25 TABLET, FILM COATED ORAL at 13:59

## 2022-12-28 RX ADMIN — ENOXAPARIN SODIUM 30 MG: 30 INJECTION SUBCUTANEOUS at 18:05

## 2022-12-28 RX ADMIN — GABAPENTIN 300 MG: 300 CAPSULE ORAL at 21:57

## 2022-12-28 NOTE — NURSING NOTE
Reason for Wound, Ostomy and Continence (WOC) Nursing Consultation: \"buttock DTPI\"    Patient awake and pleasant.  Family/support person not present.      Wounds noted by WOC:scattered DTPI from toilet    1.Wound Assessment  Wound Type: Pressure Injury Deep Tissue Pressure Injury (DTPI)  Location: bilateral glute  Left lower gluteal blanchable, weeping slightly, most likely friction burn getting off of toilet but could be pressure  Measurements: [id number]  Wound Bed: non-blanchable, maroon/purple and red, indurated  Wound Edges: Irregular  Periwound Skin: intact, gray and swelling   Drainage Characteristics/Odor: none  Drainage Amount: none  Pain: No   Care provided: cleansed, barrier cream applied  Notes: will order venelex  Wound Image:        Recommendation(s) for management of wound:   -Refer to wound care orders for specific instructions on how to treat/manage wound.  -q2 turn wedge, specialty bed, venelex, protein intake-nutrition consult placed, pt/ot  -minimize incontinence via properly applied purewick, no foam dressings to bottom  -Practice pressure injury prevention protocol.    Most recent Gordon Scale score:  Sensory Perception: 3-->slightly limited  Moisture: 3-->occasionally moist  Activity: 3-->walks occasionally  Mobility: 3-->slightly limited  Nutrition: 3-->adequate  Friction and Shear: 1-->problem  Gordon Score: 16 (12/28/22 0819)   Specialty support surface: Low Air Loss (AROLDO) Mattress   --on waffle now, will order    Pressure Injury Prevention Protocol (initiate for Gordon Score of 18 or less):   *Keep skin dry, turn q 2 hr, keep heels elevated and offloaded with offloading heel boots.    *Apply z-guard to bottom and bony prominences daily and as needed with incontinence episodes.  *Follow C.A.R.E protocol if medical devices (Bipap, souza, Ng tube, etc) are being used.     WOC Team will continue to follow.  Please re consult if the wound(s) worsens.

## 2022-12-28 NOTE — CONSULTS
Clinical Nutrition       Patient Name: Corie Kessler  YOB: 1922  MRN: [id number]  Date of Encounter: 12/28/22 14:41 EST  Admission date: 12/27/2022    Reason for Visit   WOC RN consult     EMR Reviewed     EMR Reviewed: yes     Admission Diagnosis:  Decreased ambulation status [Z74.09]    Problem List:    Decreased ambulation status    Anthropometric      Flowsheet Rows    Flowsheet Row First Filed Value   Admission Height 157.5 cm (62\") Documented at 12/27/2022 1024   Admission Weight 49.9 kg (110 lb) Documented at 12/27/2022 1024        Height: 157.5 cm (62\")  Last Filed Weight: Weight: 49.9 kg (110 lb) (12/27/22 1024)  Weight Method: Estimated  BMI: BMI (Calculated): 20.1  BMI classification: Normal: 18.5-24.9kg/m2   IBW:  110 lb     Reported/Observed/Food/Nutrition Related - Comments     Pt lying in bed in NAD at time visit, alert and able to communicate well, appears well nourished. She tells me she eats well at home and voices understanding importance nutrition. Family at beside agree, state she does very well for her age, has always been a good eater with no nutritional concerns. Pt lives alone but both dtrs close by and check on daily. She really likes milk and usually drinks about a carton per week per dtr. Dtr does shopping/cooking. Pt asks what she can eat to \"help my legs.\" RD encouraged general balanced diet/adeaute intakes and educated that this does assist with strength as well as wound healing. She has tried Boost before but hasnt needed as eating adequately, open to having while here. She did not bring her dentures and agrees soft/ground diet best for now. Family does plan to bring in. She denies further dietary needs/preferences, NKFA.      Current Nutrition Prescription     Diet: Regular/House Diet; Texture: Soft to Chew (NDD 3); Soft to Chew: Ground Meat; Fluid Consistency: Thin (IDDSI 0)  Orders Placed This Encounter      Dietary Nutrition Supplements Boost Plus;  chocolate      Average Intake from Charting: none yet documented    Nutrition Diagnosis     Problem Biting/chewing difficulty   Etiology Edentulism    Signs/Symptoms Pt report/observation   Status:    Actions     Follow treatment progress, Care plan reviewed, Advise alternate selection, Advised available snacks, Interview for preferences, Menu provided, Encourage intake, Supplement provided Boost+ at breakfast    Monitor Per Protocol      Bree Palomino, RD  Time Spent: 30

## 2022-12-28 NOTE — PLAN OF CARE
Goal Outcome Evaluation:  Plan of Care Reviewed With: patient           Outcome Evaluation: PT evaluation completed.  Pt transferred supine<-->sit with MinAx1, stood multiple reps with MinAx1, completed 2 stand step transfers using rw and ambulated 3 feet with MinAx1.  Skilled PT services warranted to address weakness, balance deficits, and decreased activity tolerance.  Pt normally lives alone and would benefit from inpt rehab at d/c.

## 2022-12-28 NOTE — PROGRESS NOTES
[facility] Medicine Services  PROGRESS NOTE    Patient Name: Corie Kessler  : 1922  MRN: [id number]    Date of Admission: 2022  Primary Care Physician: Sweta Nagel MD    Subjective   Subjective     CC:  Follow up UTI    HPI:  Grandson at bedside. Patient with some confusion today, had similar confusion during previous hospitalizations. She does complain of burning with urination.    ROS:  Gen- No fevers, chills  CV- No chest pain, palpitations  Resp- No cough, dyspnea  GI- No N/V/D, abd pain     Objective   Objective     Vital Signs:   Temp:  [97.5 °F (36.4 °C)-98.1 °F (36.7 °C)] 98 °F (36.7 °C)  Heart Rate:  [55-70] 55  Resp:  [16-18] 18  BP: (121-180)/(52-90) 180/90     Physical Exam:  Constitutional: Awake, alert, NAD, sitting up in bed  HENT: NCAT, mucous membranes moist  Respiratory: Clear to auscultation bilaterally, respiratory effort normal   Cardiovascular: RRR, palpable radial pulses  Gastrointestinal: Positive bowel sounds, soft, nontender, nondistended  Musculoskeletal: No bilateral ankle edema  Psychiatric: Appropriate affect, cooperative  Neurologic: Hard of hearing, slightly confused to situation, speech clear and fluent    Results Reviewed:  LAB RESULTS:      Lab 22  0654 22  1031   WBC 5.60 9.73   HEMOGLOBIN 11.8* 13.7   HEMATOCRIT 35.5 40.6   PLATELETS 188 235   NEUTROS ABS 3.82 8.14*   IMMATURE GRANS (ABS) 0.03 0.05   LYMPHS ABS 0.96 0.70   MONOS ABS 0.54 0.82   EOS ABS 0.20 0.00   MCV 93.7 92.9   SED RATE  --  46*   CRP  --  3.31*   LACTATE  --  1.8         Lab 22  0654 22  1031   SODIUM 139 137   POTASSIUM 3.7 3.6   CHLORIDE 110* 103   CO2 18.0* 20.0*   ANION GAP 11.0 14.0   BUN 19 24*   CREATININE 0.71 0.98   EGFR 76.0 51.6*   GLUCOSE 99 150*   CALCIUM 8.8 9.9*   MAGNESIUM 2.2 2.0   PHOSPHORUS 3.6  --    TSH  --  5.700*         Lab 22  1031   TOTAL PROTEIN 7.1   ALBUMIN 4.2   GLOBULIN 2.9   ALT (SGPT) 143*   AST  (SGOT) 119*   BILIRUBIN 1.1   ALK PHOS 199*         Lab 12/27/22  1031   TROPONIN T <0.010             Lab 12/27/22  1031   VITAMIN B 12 685         Brief Urine Lab Results  (Last result in the past 365 days)      Color   Clarity   Blood   Leuk Est   Nitrite   Protein   CREAT   Urine HCG        12/27/22 1103 Dark Yellow   Turbid   Negative   Small (1+)   Negative   100 mg/dL (2+)                 Microbiology Results Abnormal     Procedure Component Value - Date/Time    COVID-19 and FLU A/B PCR - Swab, Nasopharynx [[id number]]  (Normal) Collected: 12/27/22 1107    Lab Status: Final result Specimen: Swab from Nasopharynx Updated: 12/27/22 1207     COVID19 Not Detected     Influenza A PCR Not Detected     Influenza B PCR Not Detected    Narrative:      Fact sheet for providers: https://www.fda.gov/media/813034/download    Fact sheet for patients: https://www.fda.gov/media/527083/download    Test performed by PCR.          CT Head Without Contrast    Result Date: 12/28/2022  CT HEAD WO CONTRAST-  Date of Exam: 12/28/2022 10:06 AM  Indication: Weakness and fall; Z74.09-Other reduced mobility; M62.82-Rhabdomyolysis; E83.52-Hypercalcemia; E86.0-Dehydration; R79.89-Other specified abnormal findings of blood chemistry; R53.1-Weakness; R82.90-Unspecified abnormal findings in urine; R79.89-Other specified abnormal findings of blood chemistry; L89.302-Pressure ulcer of unspecified buttock, stage 2; R73.09-Other abnormal glucose.  Comparison: 3/28/2010  Technique:  Contiguous axial CT images of the head were obtained without contrast from skull base to vertex.  Coronal and sagittal reconstructions were performed.  Automated exposure control and iterative reconstruction methods were used.   FINDINGS: Brain/Ventricles: There is moderate diffuse atrophy which is slightly progressed in the comparison. No suspicious extra-axial fluid collections are noted. No acute intracranial hemorrhage or mass effect noted. Patchy confluent areas  of low-attenuation within the supratentorial white matter noted compatible with small vessel ischemic changes for this age group.  Orbits: The visualized portion of the orbits demonstrate no acute abnormality.  Sinuses:Chronic left mastoid effusion noted. The right mastoid air cells are grossly clear. Partial opacification of the paranasal sinuses including the left frontal, ethmoid, maxillary and sphenoid air cells noted.  Soft Tissues/Skull: No acute abnormality of the visualized skull or soft tissues.      Impression:  1. Chronic atrophy and white matter changes compatible small vessel ischemic disease 2. No acute intracranial hemorrhage or mass effect 3. Acute paranasal sinusitis 4. Left mastoid effusion which appears to be chronic. Please correlate clinically  This report was finalized on 12/28/2022 11:15 AM by Jarvis Prieto.      XR Chest 1 View    Result Date: 12/27/2022  DATE OF EXAM: 12/27/2022 11:09 AM  PROCEDURE: XR CHEST 1 VW-  INDICATIONS: Weak/Dizzy/AMS triage protocol  COMPARISON: 3/30/2010 portable chest radiograph  TECHNIQUE: Single radiographic AP view of the chest was obtained.  FINDINGS: Heart is normal in size. Vasculature appears normal. Mild coarsening of pulmonary interstitial markings may be chronic senescent change, given patient's age. There may be a small focus of atelectasis in the right lung base but no other potential focal lung disease is seen. No effusion or pneumothorax is seen. 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 No acute bony abnormalities are appreciated.      Impression: Suspected small focus of left basilar atelectasis, seen superimposed on the left heart shadow. Mild nonspecific pulmonary interstitial changes elsewhere.  This report was finalized on 12/27/2022 11:52 AM by Dr. Jeff Jackson MD.            I have reviewed the medications:  Scheduled Meds:castor oil-balsam peru, 1 application, Topical, Q12H  [START ON 12/29/2022] cefTRIAXone, 1 g, Intravenous,  Q24H  enoxaparin, 30 mg, Subcutaneous, Q24H  fluPHENAZine, 2.5 mg, Oral, Daily  gabapentin, 300 mg, Oral, TID  lisinopril, 5 mg, Oral, Q24H  metoprolol tartrate, 25 mg, Oral, Q12H  senna-docusate sodium, 2 tablet, Oral, BID  sodium chloride, 10 mL, Intravenous, Q12H      Continuous Infusions:sodium chloride, 75 mL/hr, Last Rate: 75 mL/hr (12/27/22 2300)      PRN Meds:.•  acetaminophen **OR** acetaminophen  •  senna-docusate sodium **AND** polyethylene glycol **AND** bisacodyl **AND** bisacodyl  •  influenza vaccine  •  ondansetron  •  sodium chloride  •  sodium chloride  •  sodium chloride    Assessment & Plan   Assessment & Plan     Active Hospital Problems    Diagnosis  POA   • **UTI (urinary tract infection), bacterial [N39.0, A49.9]  Yes   • Hypertension [I10]  Yes   • Decreased ambulation status [Z74.09]  Yes      Resolved Hospital Problems   No resolved problems to display.        Brief Hospital Course to date:  Corie Kessler is a 89 y.o. female w/ HTN, neuropathy, who lives independently at home w/ daily checks by family who acutely had weakness in her legs while trying to use the commode, unable to get up overnight and pressed life alert; complaining of dysuria w/ abnormal UA    The following problems are new to me today    Assessment/Plan    Acute GNR UTI  Generalized weakness  -UrCx w/ 50,000 CFU GNR, she is symptomatic from the same  -cont empiric CTX and await culture data  -PT/OT    HTN  -restart home lopressor and lisinopril at reduced dose (was borderline BP on arrival) and watch BP, uptitrate as appropriate    Chronic neuropathy  -restart home gabapentin    Expected Discharge Location and Transportation: home w/ HH/family assist  Expected Discharge 12/30  Expected Discharge Date and Time     Expected Discharge Date Expected Discharge Time    Dec 31, 2022            DVT prophylaxis:  Medical DVT prophylaxis orders are present.     AM-PAC 6 Clicks Score (PT): 13 (12/28/22 6557)    CODE STATUS:   Code  Status and Medical Interventions:   Ordered at: 12/28/22 1458     Medical Intervention Limits:    NO intubation (DNI)     Code Status (Patient has no pulse and is not breathing):    No CPR (Do Not Attempt to Resuscitate)     Medical Interventions (Patient has pulse or is breathing):    Limited Support     Comments:    Per H&P patient is DNR     Release to patient:    Routine Release       Nicolas Still, DO  12/28/22

## 2022-12-28 NOTE — THERAPY EVALUATION
Patient Name: Corie Kessler  : 1922    MRN: [id number]                              Today's Date: 2022       Admit Date: 2022    Visit Dx:     ICD-10-CM ICD-9-CM   1. Decreased ambulation status  Z74.09 780.99   2. Non-traumatic rhabdomyolysis  M62.82 728.88   3. Hypercalcemia  E83.52 275.42   4. Dehydration  E86.0 276.51   5. LFT elevation  R79.89 790.6   6. Weakness  R53.1 780.79   7. Abnormal urine finding  R82.90 791.9   8. Elevated TSH  R79.89 794.5   9. Pressure injury of buttock, stage 2, unspecified laterality (HCC)  L89.302 707.05     707.22   10. Elevated glucose  R73.09 790.29     Patient Active Problem List   Diagnosis   • Decreased ambulation status   • Hypertension   • UTI (urinary tract infection), bacterial     Past Medical History:   Diagnosis Date   • Hypertension      History reviewed. No pertinent surgical history.   General Information     Row Name 22 [facility]          Physical Therapy Time and Intention    Document Type evaluation  -LM     Mode of Treatment individual therapy;physical therapy  -LM     Row Name 22 [facility]          General Information    Patient Profile Reviewed yes  -LM     Prior Level of Function independent:;all household mobility;gait;ADL's  Uses rw for mobility  -LM     Existing Precautions/Restrictions fall  -LM     Barriers to Rehab none identified  -LM     Row Name 22 1556          Living Environment    People in Home alone  Dtr lives close by and can assist as needed  -LM     Row Name 22 1556          Home Main Entrance    Number of Stairs, Main Entrance none  -LM     Row Name 22 1556          Stairs Within Home, Primary    Number of Stairs, Within Home, Primary none  -LM     Row Name 22 1556          Cognition    Orientation Status (Cognition) oriented x 4  -LM     Row Name 22 1551          Safety Issues, Functional Mobility    Safety Issues Affecting Function (Mobility) insight into deficits/self-awareness;safety  precaution awareness;safety precautions follow-through/compliance;sequencing abilities  -LM     Impairments Affecting Function (Mobility) balance;endurance/activity tolerance;pain;range of motion (ROM);strength  -LM           User Key  (r) = Recorded By, (t) = Taken By, (c) = Cosigned By    Initials Name Provider Type    Nicky Park PT Physical Therapist               Mobility     Row Name 12/28/22 1559          Bed Mobility    Bed Mobility supine-sit;sit-supine  -LM     Supine-Sit [place] (Bed Mobility) minimum assist (75% patient effort);1 person assist;verbal cues  -LM     Sit-Supine [place] (Bed Mobility) minimum assist (75% patient effort);1 person assist;verbal cues  -LM     Assistive Device (Bed Mobility) bed rails;head of bed elevated  -LM     Comment, (Bed Mobility) Vc's for sequencing.  -LM     Row Name 12/28/22 1559          Bed-Chair Transfer    Bed-Chair [place] (Transfers) minimum assist (75% patient effort);1 person assist;verbal cues  -LM     Assistive Device (Bed-Chair Transfers) walker, front-wheeled  -LM     Comment, (Bed-Chair Transfer) 2 stand step transfers completed from bed-->BSC-->bed.  Dependent pericare provided.  -LM     Row Name 12/28/22 1559          Sit-Stand Transfer    Sit-Stand [place] (Transfers) minimum assist (75% patient effort);1 person assist;verbal cues  -LM     Assistive Device (Sit-Stand Transfers) walker, front-wheeled  -LM     Comment, (Sit-Stand Transfer) Stood x 4 reps - three times from EOB and once from BSC.  Vc's for hand placement.  -LM     Row Name 12/28/22 1559          Gait/Stairs (Locomotion)    [place] Level (Gait) minimum assist (75% patient effort);1 person assist;verbal cues  -LM     Assistive Device (Gait) walker, front-wheeled  -LM     Distance in Feet (Gait) 3  -LM     Deviations/Abnormal Patterns (Gait) [place] decreased;gait speed decreased;stride length decreased;steppage  -LM     Bilateral Gait Deviations forward flexed  posture;foot drop/toe drag  -LM     Comment, (Gait/Stairs) Vc's for upright posture.  Pt states she is unable to DF her feet but is unsure why she can't.  Steppage gait noted.  -LM           User Key  (r) = Recorded By, (t) = Taken By, (c) = Cosigned By    Initials Name Provider Type    LM Nicky Bennett PT Physical Therapist               Obj/Interventions     Row Name 12/28/22 1604          Range of Motion Comprehensive    General Range of Motion lower extremity range of motion deficits identified  -LM     Comment, General Range of Motion BLE AROM WFL with exception of B Ankle DF - pt states this has been a chronic problem, but is unsure what caused it  -LM     Row Name 12/28/22 1604          Strength Comprehensive (MMT)    General Manual Muscle Testing (MMT) Assessment lower extremity strength deficits identified  -LM     Comment, General Manual Muscle Testing (MMT) Assessment BLEs - Hip flex - 4-/5; Knee flex/ext - 4-/5; Ankle DF - 1/5  (full resistance not given to hips/knees d/t pain)  -     Row Name 12/28/22 1604          Balance    Balance Assessment sitting static balance;standing static balance;standing dynamic balance  -LM     Static Sitting Balance standby assist  -LM     Position, Sitting Balance unsupported;sitting edge of bed  -LM     Static Standing Balance contact guard;1-person assist;verbal cues  -LM     Dynamic Standing Balance minimal assist;1-person assist;verbal cues  -LM     Position/Device Used, Standing Balance supported;walker, front-wheeled  -     Row Name 12/28/22 1604          Sensory Assessment (Somatosensory)    Sensory Assessment (Somatosensory) LE sensation intact  -           User Key  (r) = Recorded By, (t) = Taken By, (c) = Cosigned By    Initials Name Provider Type    Nicky Park, PT Physical Therapist               Goals/Plan     Row Name 12/28/22 1604          Bed Mobility Goal 1 (PT)    Activity/Assistive Device (Bed Mobility Goal 1, PT) sit to supine/supine to sit   -LM     [place] Level/Cues Needed (Bed Mobility Goal 1, PT) standby assist  -LM     Time Frame (Bed Mobility Goal 1, PT) long term goal (LTG);2 weeks  -LM     Row Name 12/28/22 1605          Transfer Goal 1 (PT)    Activity/Assistive Device (Transfer Goal 1, PT) bed-to-chair/chair-to-bed  -LM     [place] Level/Cues Needed (Transfer Goal 1, PT) standby assist  -LM     Time Frame (Transfer Goal 1, PT) long term goal (LTG);2 weeks  -LM     Row Name 12/28/22 1600          Gait Training Goal 1 (PT)    Activity/Assistive Device (Gait Training Goal 1, PT) gait (walking locomotion);assistive device use  -LM     [place] Level (Gait Training Goal 1, PT) standby assist  -LM     Distance (Gait Training Goal 1, PT) 150 feet  -LM     Time Frame (Gait Training Goal 1, PT) long term goal (LTG);2 weeks  -LM     Row Name 12/28/22 1607          Therapy Assessment/Plan (PT)    Planned Therapy Interventions (PT) balance training;bed mobility training;gait training;home exercise program;motor coordination training;neuromuscular re-education;patient/family education;postural re-education;ROM (range of motion);strengthening;stretching;transfer training  -LM           User Key  (r) = Recorded By, (t) = Taken By, (c) = Cosigned By    Initials Name Provider Type    Nicky Park, PT Physical Therapist               Clinical Impression     Row Name 12/28/22 1601          Pain    Pretreatment Pain Rating 0/10 - no pain  -LM     Posttreatment Pain Rating 0/10 - no pain  -LM     Row Name 12/28/22 1607          Plan of Care Review    Plan of Care Reviewed With patient  -LM     Outcome Evaluation PT evaluation completed.  Pt transferred supine<-->sit with MinAx1, stood multiple reps with MinAx1, completed 2 stand step transfers using rw and ambulated 3 feet with MinAx1.  Skilled PT services warranted to address weakness, balance deficits, and decreased activity tolerance.  Pt normally lives alone and would benefit from inpt rehab  at d/c.  -LM     Row Name 12/28/22 1605          Therapy Assessment/Plan (PT)    Rehab Potential (PT) good, to achieve stated therapy goals  -LM     Criteria for Skilled Interventions Met (PT) yes;meets criteria;skilled treatment is necessary  -LM     Therapy Frequency (PT) daily  -LM     Row Name 12/28/22 1605          Vital Signs    Pre Systolic BP Rehab 194  -LM     Pre Treatment Diastolic   -LM     Post Systolic BP Rehab 191  -LM     Post Treatment Diastolic BP 83  -LM     Pretreatment Heart Rate (beats/min) 100  -LM     Intratreatment Heart Rate (beats/min) 145  -LM     Posttreatment Heart Rate (beats/min) 105  -LM     Pre SpO2 (%) 97  -LM     O2 Delivery Pre Treatment room air  -LM     Post SpO2 (%) 96  -LM     O2 Delivery Post Treatment room air  -LM     Pre Patient Position Supine  -LM     Intra Patient Position Sitting  -LM     Post Patient Position Supine  -LM     Row Name 12/28/22 1605          Positioning and Restraints    Pre-Treatment Position in bed  -LM     Post Treatment Position bed  -LM     In Bed supine;call light within reach;encouraged to call for assist;exit alarm on;waffle boots/both;notified nsg  -LM           User Key  (r) = Recorded By, (t) = Taken By, (c) = Cosigned By    Initials Name Provider Type    LM Nicky Bennett, PT Physical Therapist               Outcome Measures     Row Name 12/28/22 1608 12/28/22 0819       How much help from another person do you currently need...    Turning from your back to your side while in flat bed without using bedrails? 3  -LM 3  -LMA    Moving from lying on back to sitting on the side of a flat bed without bedrails? 2  -LM 2  -LMA    Moving to and from a bed to a chair (including a wheelchair)? 3  -LM 2  -LMA    Standing up from a chair using your arms (e.g., wheelchair, bedside chair)? 3  -LM 2  -LMA    Climbing 3-5 steps with a railing? 2  -LM 2  -LMA    To walk in hospital room? 3  -LM 2  -LMA    AM-PAC 6 Clicks Score (PT) 16  -LM 13  -LMA     Highest level of mobility 5 --> Static standing  - 4 --> Transferred to chair/commode  -LMA    Row Name 12/28/22 1608 12/28/22 0945       Functional Assessment    Outcome Measure Options AM-PAC 6 Clicks Basic Mobility (PT)  - AM-PAC 6 Clicks Daily Activity (OT)  -CS          User Key  (r) = Recorded By, (t) = Taken By, (c) = Cosigned By    Initials Name Provider Type    LM Nicky Bennett, MIO Physical Therapist    Kacey Novak, RN Registered Nurse    Mj Hope, OT Occupational Therapist                             Physical Therapy Education     Title: PT OT SLP Therapies (In Progress)     Topic: Physical Therapy (In Progress)     Point: Mobility training (Done)     Learning Progress Summary           Patient Acceptance, E, VU,NR by  at 12/28/2022 1608                   Point: Home exercise program (Not Started)     Learner Progress:  Not documented in this visit.          Point: Precautions (Done)     Learning Progress Summary           Patient Acceptance, E, VU,NR by  at 12/28/2022 1608                               User Key     Initials Effective Dates Name Provider Type Discipline     06/16/21 -  Nicky Bennett, PT Physical Therapist PT              PT Recommendation and Plan  Planned Therapy Interventions (PT): balance training, bed mobility training, gait training, home exercise program, motor coordination training, neuromuscular re-education, patient/family education, postural re-education, ROM (range of motion), strengthening, stretching, transfer training  Plan of Care Reviewed With: patient  Outcome Evaluation: PT evaluation completed.  Pt transferred supine<-->sit with MinAx1, stood multiple reps with MinAx1, completed 2 stand step transfers using rw and ambulated 3 feet with MinAx1.  Skilled PT services warranted to address weakness, balance deficits, and decreased activity tolerance.  Pt normally lives alone and would benefit from inpt rehab at d/c.     Time Calculation:    PT  Charges     Row Name 12/28/22 1609             Time Calculation    Start Time 1518  -LM      PT Received On 12/28/22  -LM      PT Goal Re-Cert Due Date 01/07/23  -LM         Timed Charges    16738 - PT Therapeutic Activity Minutes 8  -LM         Untimed Charges    PT Eval/Re-eval Minutes 46  -LM         Total Minutes    Timed Charges Total Minutes 8  -LM      Untimed Charges Total Minutes 46  -LM       Total Minutes 54  -LM            User Key  (r) = Recorded By, (t) = Taken By, (c) = Cosigned By    Initials Name Provider Type     Nicky Bennett, PT Physical Therapist              Therapy Charges for Today     Code Description Service Date Service Provider Modifiers Qty    04941797580 HC PT EVAL MOD COMPLEXITY 4 12/28/2022 Nicky Bennett, PT GP 1    11420930604 HC PT THERAPEUTIC ACT EA 15 MIN 12/28/2022 Nicky Bennett, PT GP 1          PT G-Codes  Outcome Measure Options: AM-PAC 6 Clicks Basic Mobility (PT)  AM-PAC 6 Clicks Score (PT): 16  AM-PAC 6 Clicks Score (OT): 17  PT Discharge Summary  Anticipated Discharge Disposition (PT): inpatient rehabilitation facility    Nicky Bennett PT  12/28/2022

## 2022-12-28 NOTE — THERAPY EVALUATION
Patient Name: Corie Kessler  : 1922    MRN: [id number]                              Today's Date: 2022       Admit Date: 2022    Visit Dx:     ICD-10-CM ICD-9-CM   1. Decreased ambulation status  Z74.09 780.99   2. Non-traumatic rhabdomyolysis  M62.82 728.88   3. Hypercalcemia  E83.52 275.42   4. Dehydration  E86.0 276.51   5. LFT elevation  R79.89 790.6   6. Weakness  R53.1 780.79   7. Abnormal urine finding  R82.90 791.9   8. Elevated TSH  R79.89 794.5   9. Pressure injury of buttock, stage 2, unspecified laterality (HCC)  L89.302 707.05     707.22   10. Elevated glucose  R73.09 790.29     Patient Active Problem List   Diagnosis   • Decreased ambulation status     Past Medical History:   Diagnosis Date   • Hypertension      History reviewed. No pertinent surgical history.   General Information     Row Name 22          OT Time and Intention    Document Type evaluation  -CS     Mode of Treatment occupational therapy  -CS     Row Name 22          General Information    Patient Profile Reviewed yes  -CS     Prior Level of Function independent:;all household mobility;ADL's;dependent:;driving  Pt reports use of RW for mobility, grab bars for safe transfers into step-in tub, Dtrs live nearby and assist as needed.  -CS     Existing Precautions/Restrictions fall  -CS     Barriers to Rehab none identified  -CS     Row Name 22          Living Environment    People in Home alone;other (see comments)  Adult Dtrs live nearby and assist as needed  -CS     Row Name 22          Home Main Entrance    Number of Stairs, Main Entrance none  -CS     Row Name 22          Stairs Within Home, Primary    Number of Stairs, Within Home, Primary none  -CS     Row Name 22          Cognition    Orientation Status (Cognition) oriented x 4  -CS     Row Name 22          Safety Issues, Functional Mobility    Safety Issues Affecting Function (Mobility)  safety precaution awareness;safety precautions follow-through/compliance  -     Impairments Affecting Function (Mobility) balance;endurance/activity tolerance;strength  -           User Key  (r) = Recorded By, (t) = Taken By, (c) = Cosigned By    Initials Name Provider Type    CS Mj Tyler OT Occupational Therapist                 Mobility/ADL's     Row Name 12/28/22 0936          Bed Mobility    Bed Mobility supine-sit;sit-supine;scooting/bridging  -CS     Scooting/Bridging [place] (Bed Mobility) minimum assist (75% patient effort)  -CS     Sit-Supine [place] (Bed Mobility) minimum assist (75% patient effort)  -CS     Assistive Device (Bed Mobility) bed rails;head of bed elevated;draw sheet  -CS     Comment, (Bed Mobility) appropriate sequencing intact, no dizziness upon sitting, assist at trunk  -     Row Name 12/28/22 0936          Transfers    Transfers toilet transfer  -     Row Name 12/28/22 09          Toilet Transfer    Type (Toilet Transfer) stand pivot/stand step  -     [place] Level (Toilet Transfer) minimum assist (75% patient effort);verbal cues  -     Assistive Device (Toilet Transfer) walker, front-wheeled  -     Comment, (Toilet Transfer) approx 3 ft side steps to BSC, cues for improved safety and sequencing w/ RW use  -     Row Name 12/28/22 0936          Functional Mobility    Functional Mobility- Comment defer to PT for specifics  -     Row Name 12/28/22 0936          Activities of Daily Living    BADL Assessment/Intervention lower body dressing;grooming;toileting;feeding  -     Row Name 12/28/22 0936          Lower Body Dressing Assessment/Training    [place] Level (Lower Body Dressing) don;socks;minimum assist (75% patient effort)  -     Position (Lower Body Dressing) edge of bed sitting  -CS     Row Name 12/28/22 0936          Grooming Assessment/Training    [place] Level (Grooming) hair care, combing/brushing;wash face, hands;set up  -CS      Position (Grooming) sitting up in bed  -     Row Name 12/28/22 0936          Toileting Assessment/Training    Mobile Level (Toileting) adjust/manage clothing;perform perineal hygiene;moderate assist (50% patient effort)  -     Position (Toileting) supported sitting;supported standing  -     Row Name 12/28/22 0936          Self-Feeding Assessment/Training    Mobile Level (Feeding) liquids to mouth;scoop food and bring to mouth;independent  -     Position (Self-Feeding) sitting up in bed  -     Row Name 12/28/22 09          Upper Body Dressing Assessment/Training    Mobile Level (Upper Body Dressing) don;pajama/robe;standby assist  -     Position (Upper Body Dressing) edge of bed sitting  -           User Key  (r) = Recorded By, (t) = Taken By, (c) = Cosigned By    Initials Name Provider Type    CS Mj Tyler OT Occupational Therapist               Obj/Interventions     Row Name 12/28/22 0939          Sensory Assessment (Somatosensory)    Sensory Assessment (Somatosensory) UE sensation intact  -     Row Name 12/28/22 0939          Vision Assessment/Intervention    Visual Impairment/Limitations WFL  -     Row Name 12/28/22 0939          Range of Motion Comprehensive    General Range of Motion bilateral upper extremity ROM WFL  -     Row Name 12/28/22 0939          Strength Comprehensive (MMT)    General Manual Muscle Testing (MMT) Assessment upper extremity strength deficits identified  -     Comment, General Manual Muscle Testing (MMT) Assessment BUE grossly 4/5  -     Row Name 12/28/22 0939          Motor Skills    Motor Skills coordination;functional endurance  -     Coordination bimanual skills;WFL  -     Functional Endurance O2 sats stable on RA  -     Row Name 12/28/22 0939          Balance    Balance Assessment sitting static balance;sitting dynamic balance;standing static balance;standing dynamic balance  -     Static Sitting Balance standby assist   -CS     Dynamic Sitting Balance contact guard  -CS     Position, Sitting Balance unsupported;sitting edge of bed  -CS     Static Standing Balance contact guard  -CS     Dynamic Standing Balance minimal assist  -CS     Position/Device Used, Standing Balance supported;walker, front-wheeled  -CS     Balance Interventions sitting;sit to stand;standing;occupation based/functional task  -CS     Comment, Balance assist required for dynamic activities, standing balance during bran-care  -CS           User Key  (r) = Recorded By, (t) = Taken By, (c) = Cosigned By    Initials Name Provider Type    CS Mj Tyler, OT Occupational Therapist               Goals/Plan     Row Name 12/28/22 0944          Bed Mobility Goal 1 (OT)    Activity/Assistive Device (Bed Mobility Goal 1, OT) supine to sit;sit to supine;scooting  -CS     [place] Level/Cues Needed (Bed Mobility Goal 1, OT) supervision required  -CS     Time Frame (Bed Mobility Goal 1, OT) long term goal (LTG);10 days  -CS     Progress/Outcomes (Bed Mobility Goal 1, OT) new goal  -CS     Row Name 12/28/22 0944          Transfer Goal 1 (OT)    Activity/Assistive Device (Transfer Goal 1, OT) bed-to-chair/chair-to-bed;toilet;walker, rolling  -CS     [place] Level/Cues Needed (Transfer Goal 1, OT) supervision required  -CS     Time Frame (Transfer Goal 1, OT) long term goal (LTG);10 days  -CS     Progress/Outcome (Transfer Goal 1, OT) new goal  -CS     Row Name 12/28/22 0944          Dressing Goal 1 (OT)    Activity/Device (Dressing Goal 1, OT) lower body dressing  -CS     [place]/Cues Needed (Dressing Goal 1, OT) standby assist  -CS     Time Frame (Dressing Goal 1, OT) long term goal (LTG);10 days  -CS     Progress/Outcome (Dressing Goal 1, OT) new goal  -CS     Row Name 12/28/22 0944          Grooming Goal 1 (OT)    Activity/Device (Grooming Goal 1, OT) oral care;wash face, hands;hair care  -CS     [place] (Grooming Goal 1, OT) supervision required  -CS      Time Frame (Grooming Goal 1, OT) long term goal (LTG);10 days  -CS     Strategies/Barriers (Grooming Goal 1, OT) sinkside  -CS     Progress/Outcome (Grooming Goal 1, OT) new goal  -CS     Row Name 12/28/22 0944          Therapy Assessment/Plan (OT)    Planned Therapy Interventions (OT) functional balance retraining;occupation/activity based interventions;ROM/therapeutic exercise;strengthening exercise;transfer/mobility retraining;patient/caregiver education/training;neuromuscular control/coordination retraining;adaptive equipment training  -CS           User Key  (r) = Recorded By, (t) = Taken By, (c) = Cosigned By    Initials Name Provider Type    CS Mj Tyler, OT Occupational Therapist               Clinical Impression     Row Name 12/28/22 0941          Pain Assessment    Pretreatment Pain Rating 0/10 - no pain  -CS     Posttreatment Pain Rating 0/10 - no pain  -CS     Pre/Posttreatment Pain Comment tolerated  -CS     Pain Intervention(s) Repositioned;Ambulation/increased activity  -CS     Row Name 12/28/22 0941          Plan of Care Review    Plan of Care Reviewed With patient  -CS     Progress no change  -CS     Outcome Evaluation OT eval completed. Baseline ADL completion limited by generalized weakness and standing balance deficit warranting skilled IP OT services to promote return to PLOF. Mani for ADL related mobilty, ModA for LB dressing and toileting, Ind for seated grooming and feeding. Pt would benefit from short IP rehab to return to baseline independence, if not then home with supervision/assist and HHOT/PT.  -CS     Row Name 12/28/22 0941          Therapy Assessment/Plan (OT)    Rehab Potential (OT) good, to achieve stated therapy goals  -CS     Criteria for Skilled Therapeutic Interventions Met (OT) yes;meets criteria;skilled treatment is necessary  -CS     Therapy Frequency (OT) daily  -CS     Row Name 12/28/22 0941          Therapy Plan Review/Discharge Plan (OT)    Anticipated Discharge  Disposition (OT) inpatient rehabilitation facility  -     Row Name 12/28/22 0941          Vital Signs    Pre Systolic BP Rehab --  RN cleared for eval, VSS on RA  -CS     O2 Delivery Pre Treatment room air  -CS     O2 Delivery Intra Treatment room air  -CS     O2 Delivery Post Treatment room air  -CS     Pre Patient Position Supine  -CS     Intra Patient Position Standing  -CS     Post Patient Position Supine  -CS     Row Name 12/28/22 0941          Positioning and Restraints    Pre-Treatment Position in bed  -CS     Post Treatment Position bed  -CS     In Bed notified nsg;supine;call light within reach;encouraged to call for assist;exit alarm on  -CS           User Key  (r) = Recorded By, (t) = Taken By, (c) = Cosigned By    Initials Name Provider Type    Mj Hope, OT Occupational Therapist               Outcome Measures     Row Name 12/28/22 0945          How much help from another is currently needed...    Putting on and taking off regular lower body clothing? 2  -CS     Bathing (including washing, rinsing, and drying) 3  -CS     Toileting (which includes using toilet bed pan or urinal) 2  -CS     Putting on and taking off regular upper body clothing 3  -CS     Taking care of personal grooming (such as brushing teeth) 3  -CS     Eating meals 4  -CS     AM-PAC 6 Clicks Score (OT) 17  -CS     Row Name 12/28/22 0819          How much help from another person do you currently need...    Turning from your back to your side while in flat bed without using bedrails? 3  -LM     Moving from lying on back to sitting on the side of a flat bed without bedrails? 2  -LM     Moving to and from a bed to a chair (including a wheelchair)? 2  -LM     Standing up from a chair using your arms (e.g., wheelchair, bedside chair)? 2  -LM     Climbing 3-5 steps with a railing? 2  -LM     To walk in hospital room? 2  -LM     AM-PAC 6 Clicks Score (PT) 13  -LM     Highest level of mobility 4 --> Transferred to chair/commode   -     Row Name 12/28/22 0945          Functional Assessment    Outcome Measure Options AM-PAC 6 Clicks Daily Activity (OT)  -           User Key  (r) = Recorded By, (t) = Taken By, (c) = Cosigned By    Initials Name Provider Type    Kacey Delgado, RN Registered Nurse    Mj Hope OT Occupational Therapist                Occupational Therapy Education     Title: PT OT SLP Therapies (In Progress)     Topic: Occupational Therapy (In Progress)     Point: ADL training (Done)     Description:   Instruct learner(s) on proper safety adaptation and remediation techniques during self care or transfers.   Instruct in proper use of assistive devices.              Learning Progress Summary           Patient Acceptance, E,D, VU,DU by  at 12/28/2022 0947                   Point: Home exercise program (Not Started)     Description:   Instruct learner(s) on appropriate technique for monitoring, assisting and/or progressing therapeutic exercises/activities.              Learner Progress:  Not documented in this visit.          Point: Precautions (Done)     Description:   Instruct learner(s) on prescribed precautions during self-care and functional transfers.              Learning Progress Summary           Patient Acceptance, E,D, VU,DU by  at 12/28/2022 0947                   Point: Body mechanics (Done)     Description:   Instruct learner(s) on proper positioning and spine alignment during self-care, functional mobility activities and/or exercises.              Learning Progress Summary           Patient Acceptance, E,D, VU,DU by  at 12/28/2022 0947                               User Key     Initials Effective Dates Name Provider Type Discipline     06/16/21 -  Mj Tyler OT Occupational Therapist OT              OT Recommendation and Plan  Planned Therapy Interventions (OT): functional balance retraining, occupation/activity based interventions, ROM/therapeutic exercise, strengthening exercise,  transfer/mobility retraining, patient/caregiver education/training, neuromuscular control/coordination retraining, adaptive equipment training  Therapy Frequency (OT): daily  Plan of Care Review  Plan of Care Reviewed With: patient  Progress: no change  Outcome Evaluation: OT eval completed. Baseline ADL completion limited by generalized weakness and standing balance deficit warranting skilled IP OT services to promote return to PLOF. Mani for ADL related mobilty, ModA for LB dressing and toileting, Ind for seated grooming and feeding. Pt would benefit from short IP rehab to return to baseline independence, if not then home with supervision/assist and HHOT/PT.     Time Calculation:    Time Calculation- OT     Row Name 12/28/22 0947             Time Calculation- OT    OT Start Time 0735  -CS      OT Received On 12/28/22  -CS      OT Goal Re-Cert Due Date 01/07/23  -CS         Timed Charges    97219 - OT Therapeutic Activity Minutes 8  -CS      96240 - OT Self Care/Mgmt Minutes 16  -CS         Untimed Charges    OT Eval/Re-eval Minutes 35  -CS         Total Minutes    Timed Charges Total Minutes 24  -CS      Untimed Charges Total Minutes 35  -CS       Total Minutes 59  -CS            User Key  (r) = Recorded By, (t) = Taken By, (c) = Cosigned By    Initials Name Provider Type    CS Mj Tyler, OT Occupational Therapist              Therapy Charges for Today     Code Description Service Date Service Provider Modifiers Qty    21967795091 HC OT THERAPEUTIC ACT EA 15 MIN 12/28/2022 Mj Tyler, OT GO 1    18721606800 HC OT SELF CARE/MGMT/TRAIN EA 15 MIN 12/28/2022 Mj Tyler, OT GO 1    67191588347 HC OT EVAL LOW COMPLEXITY 3 12/28/2022 Mj Tyler, OT GO 1               Mj Tyler OT  12/28/2022

## 2022-12-28 NOTE — PLAN OF CARE
Problem: Adult Inpatient Plan of Care  Goal: Plan of Care Review  Recent Flowsheet Documentation  Taken 12/28/2022 0941 by Mj Tyler OT  Progress: no change  Plan of Care Reviewed With: patient  Outcome Evaluation: OT eval completed. Baseline ADL completion limited by generalized weakness and standing balance deficit warranting skilled IP OT services to promote return to PLOF. Mani for ADL related mobilty, ModA for LB dressing and toileting, Ind for seated grooming and feeding. Pt would benefit from short IP rehab to return to baseline independence, if not then home with supervision/assist and HHOT/PT.

## 2022-12-29 LAB — BACTERIA SPEC AEROBE CULT: ABNORMAL

## 2022-12-29 PROCEDURE — 25010000002 HYDRALAZINE PER 20 MG: Performed by: NURSE PRACTITIONER

## 2022-12-29 PROCEDURE — 25010000002 CEFTRIAXONE PER 250 MG: Performed by: INTERNAL MEDICINE

## 2022-12-29 PROCEDURE — 96372 THER/PROPH/DIAG INJ SC/IM: CPT

## 2022-12-29 PROCEDURE — 96375 TX/PRO/DX INJ NEW DRUG ADDON: CPT

## 2022-12-29 PROCEDURE — 99225 PR SBSQ OBSERVATION CARE/DAY 25 MINUTES: CPT | Performed by: INTERNAL MEDICINE

## 2022-12-29 PROCEDURE — 25010000002 ENOXAPARIN PER 10 MG: Performed by: INTERNAL MEDICINE

## 2022-12-29 PROCEDURE — G0378 HOSPITAL OBSERVATION PER HR: HCPCS

## 2022-12-29 PROCEDURE — 96366 THER/PROPH/DIAG IV INF ADDON: CPT

## 2022-12-29 RX ORDER — LISINOPRIL 20 MG/1
40 TABLET ORAL
Status: DISCONTINUED | OUTPATIENT
Start: 2022-12-30 | End: 2023-01-03 | Stop reason: HOSPADM

## 2022-12-29 RX ORDER — DORZOLAMIDE HYDROCHLORIDE AND TIMOLOL MALEATE 20; 5 MG/ML; MG/ML
SOLUTION/ DROPS OPHTHALMIC DAILY
Status: DISCONTINUED | OUTPATIENT
Start: 2022-12-29 | End: 2022-12-29

## 2022-12-29 RX ORDER — LISINOPRIL 20 MG/1
20 TABLET ORAL
Status: DISCONTINUED | OUTPATIENT
Start: 2022-12-29 | End: 2022-12-29

## 2022-12-29 RX ORDER — DORZOLAMIDE HYDROCHLORIDE AND TIMOLOL MALEATE 20; 5 MG/ML; MG/ML
SOLUTION/ DROPS OPHTHALMIC 2 TIMES DAILY
Status: DISCONTINUED | OUTPATIENT
Start: 2022-12-29 | End: 2023-01-03 | Stop reason: HOSPADM

## 2022-12-29 RX ORDER — CHOLECALCIFEROL (VITAMIN D3) 125 MCG
5 CAPSULE ORAL NIGHTLY
Status: DISCONTINUED | OUTPATIENT
Start: 2022-12-29 | End: 2023-01-03 | Stop reason: HOSPADM

## 2022-12-29 RX ORDER — METOPROLOL TARTRATE 50 MG/1
50 TABLET, FILM COATED ORAL EVERY 12 HOURS SCHEDULED
Status: DISCONTINUED | OUTPATIENT
Start: 2022-12-29 | End: 2022-12-30

## 2022-12-29 RX ORDER — LISINOPRIL 20 MG/1
20 TABLET ORAL ONCE
Status: COMPLETED | OUTPATIENT
Start: 2022-12-29 | End: 2022-12-29

## 2022-12-29 RX ADMIN — CASTOR OIL AND BALSAM, PERU 1 APPLICATION: 788; 87 OINTMENT TOPICAL at 20:55

## 2022-12-29 RX ADMIN — LISINOPRIL 20 MG: 20 TABLET ORAL at 08:49

## 2022-12-29 RX ADMIN — FLUPHENAZINE HYDROCHLORIDE 2.5 MG: 5 TABLET, FILM COATED ORAL at 08:49

## 2022-12-29 RX ADMIN — METOPROLOL TARTRATE 50 MG: 50 TABLET, FILM COATED ORAL at 20:55

## 2022-12-29 RX ADMIN — LISINOPRIL 20 MG: 20 TABLET ORAL at 15:27

## 2022-12-29 RX ADMIN — CASTOR OIL AND BALSAM, PERU 1 APPLICATION: 788; 87 OINTMENT TOPICAL at 08:54

## 2022-12-29 RX ADMIN — Medication 10 ML: at 08:50

## 2022-12-29 RX ADMIN — Medication 5 MG: at 20:55

## 2022-12-29 RX ADMIN — SODIUM CHLORIDE 1 G: 900 INJECTION INTRAVENOUS at 12:30

## 2022-12-29 RX ADMIN — GABAPENTIN 300 MG: 300 CAPSULE ORAL at 08:49

## 2022-12-29 RX ADMIN — ACETAMINOPHEN 650 MG: 325 TABLET ORAL at 02:55

## 2022-12-29 RX ADMIN — Medication 10 ML: at 20:55

## 2022-12-29 RX ADMIN — GABAPENTIN 300 MG: 300 CAPSULE ORAL at 20:55

## 2022-12-29 RX ADMIN — ENOXAPARIN SODIUM 30 MG: 30 INJECTION SUBCUTANEOUS at 18:47

## 2022-12-29 RX ADMIN — HYDRALAZINE HYDROCHLORIDE 10 MG: 20 INJECTION INTRAMUSCULAR; INTRAVENOUS at 00:06

## 2022-12-29 RX ADMIN — METOPROLOL TARTRATE 50 MG: 50 TABLET, FILM COATED ORAL at 08:49

## 2022-12-29 NOTE — PROGRESS NOTES
[facility] Medicine Services  PROGRESS NOTE    Patient Name: Corie Kessler  : 1922  MRN: [id number]    Date of Admission: 2022  Primary Care Physician: Sweta Nagel MD    Subjective   Subjective     CC:  Follow up UTI    HPI:  Complains of not having eyedrops.  Daughter at bedside, interested in rehab placement.  Also complaining patient having difficulty sleeping at night.    ROS:  Gen- No fevers, chills  CV- No chest pain, palpitations  Resp- No cough, dyspnea  GI- No N/V/D, abd pain     Objective   Objective     Vital Signs:   Temp:  [97.6 °F (36.4 °C)-98.1 °F (36.7 °C)] 97.9 °F (36.6 °C)  Heart Rate:  [] 91  Resp:  [16-18] 18  BP: (156-205)/(61-94) 189/79     Physical Exam:  Constitutional: Awake, alert, NAD, elderly female sitting up in bed in no acute distress  HENT: NCAT, mucous membranes moist  Respiratory: Clear to auscultation bilaterally, respiratory effort normal   Cardiovascular: RRR, palpable radial pulses  Gastrointestinal: Positive bowel sounds, soft, nontender, nondistended  Musculoskeletal: No bilateral ankle edema  Psychiatric: Appropriate affect, cooperative  Neurologic: Hard of hearing, speech clear and fluent    Results Reviewed:  LAB RESULTS:      Lab 22  0654 22  1031   WBC 5.60 9.73   HEMOGLOBIN 11.8* 13.7   HEMATOCRIT 35.5 40.6   PLATELETS 188 235   NEUTROS ABS 3.82 8.14*   IMMATURE GRANS (ABS) 0.03 0.05   LYMPHS ABS 0.96 0.70   MONOS ABS 0.54 0.82   EOS ABS 0.20 0.00   MCV 93.7 92.9   SED RATE  --  46*   CRP  --  3.31*   LACTATE  --  1.8         Lab 22  0654 22  1031   SODIUM 139 137   POTASSIUM 3.7 3.6   CHLORIDE 110* 103   CO2 18.0* 20.0*   ANION GAP 11.0 14.0   BUN 19 24*   CREATININE 0.71 0.98   EGFR 76.0 51.6*   GLUCOSE 99 150*   CALCIUM 8.8 9.9*   MAGNESIUM 2.2 2.0   PHOSPHORUS 3.6  --    TSH  --  5.700*         Lab 22  1031   TOTAL PROTEIN 7.1   ALBUMIN 4.2   GLOBULIN 2.9   ALT (SGPT) 143*   AST  (SGOT) 119*   BILIRUBIN 1.1   ALK PHOS 199*         Lab 12/27/22  1031   TROPONIN T <0.010             Lab 12/27/22  1031   VITAMIN B 12 685         Brief Urine Lab Results  (Last result in the past 365 days)      Color   Clarity   Blood   Leuk Est   Nitrite   Protein   CREAT   Urine HCG        12/27/22 1103 Dark Yellow   Turbid   Negative   Small (1+)   Negative   100 mg/dL (2+)                 Microbiology Results Abnormal     Procedure Component Value - Date/Time    Blood Culture - Blood, Arm, Right [[id number]]  (Normal) Collected: 12/27/22 1542    Lab Status: Preliminary result Specimen: Blood from Arm, Right Updated: 12/28/22 1645     Blood Culture No growth at 24 hours    Blood Culture - Blood, Hand, Right [[id number]]  (Normal) Collected: 12/27/22 1542    Lab Status: Preliminary result Specimen: Blood from Hand, Right Updated: 12/28/22 1645     Blood Culture No growth at 24 hours    COVID-19 and FLU A/B PCR - Swab, Nasopharynx [[id number]]  (Normal) Collected: 12/27/22 1107    Lab Status: Final result Specimen: Swab from Nasopharynx Updated: 12/27/22 1207     COVID19 Not Detected     Influenza A PCR Not Detected     Influenza B PCR Not Detected    Narrative:      Fact sheet for providers: https://www.fda.gov/media/463493/download    Fact sheet for patients: https://www.fda.gov/media/353294/download    Test performed by PCR.          CT Head Without Contrast    Result Date: 12/28/2022  CT HEAD WO CONTRAST-  Date of Exam: 12/28/2022 10:06 AM  Indication: Weakness and fall; Z74.09-Other reduced mobility; M62.82-Rhabdomyolysis; E83.52-Hypercalcemia; E86.0-Dehydration; R79.89-Other specified abnormal findings of blood chemistry; R53.1-Weakness; R82.90-Unspecified abnormal findings in urine; R79.89-Other specified abnormal findings of blood chemistry; L89.302-Pressure ulcer of unspecified buttock, stage 2; R73.09-Other abnormal glucose.  Comparison: 3/28/2010  Technique:  Contiguous axial CT images of the head were  obtained without contrast from skull base to vertex.  Coronal and sagittal reconstructions were performed.  Automated exposure control and iterative reconstruction methods were used.   FINDINGS: Brain/Ventricles: There is moderate diffuse atrophy which is slightly progressed in the comparison. No suspicious extra-axial fluid collections are noted. No acute intracranial hemorrhage or mass effect noted. Patchy confluent areas of low-attenuation within the supratentorial white matter noted compatible with small vessel ischemic changes for this age group.  Orbits: The visualized portion of the orbits demonstrate no acute abnormality.  Sinuses:Chronic left mastoid effusion noted. The right mastoid air cells are grossly clear. Partial opacification of the paranasal sinuses including the left frontal, ethmoid, maxillary and sphenoid air cells noted.  Soft Tissues/Skull: No acute abnormality of the visualized skull or soft tissues.      Impression:  1. Chronic atrophy and white matter changes compatible small vessel ischemic disease 2. No acute intracranial hemorrhage or mass effect 3. Acute paranasal sinusitis 4. Left mastoid effusion which appears to be chronic. Please correlate clinically  This report was finalized on 12/28/2022 11:15 AM by Jarvis Prieto.            I have reviewed the medications:  Scheduled Meds:castor oil-balsam peru, 1 application, Topical, Q12H  cefTRIAXone, 1 g, Intravenous, Q24H  dorzolamide-timolol, , Both Eyes, BID  enoxaparin, 30 mg, Subcutaneous, Q24H  fluPHENAZine, 2.5 mg, Oral, Daily  gabapentin, 300 mg, Oral, TID  lisinopril, 20 mg, Oral, Once  [START ON 12/30/2022] lisinopril, 40 mg, Oral, Q24H  melatonin, 5 mg, Oral, Nightly  metoprolol tartrate, 50 mg, Oral, Q12H  senna-docusate sodium, 2 tablet, Oral, BID  sodium chloride, 10 mL, Intravenous, Q12H      Continuous Infusions:   PRN Meds:.•  acetaminophen **OR** acetaminophen  •  senna-docusate sodium **AND** polyethylene glycol **AND**  bisacodyl **AND** bisacodyl  •  influenza vaccine  •  ondansetron  •  sodium chloride  •  sodium chloride  •  sodium chloride    Assessment & Plan   Assessment & Plan     Active Hospital Problems    Diagnosis  POA   • **UTI (urinary tract infection), bacterial [N39.0, A49.9]  Yes   • Hypertension [I10]  Yes   • Decreased ambulation status [Z74.09]  Yes      Resolved Hospital Problems   No resolved problems to display.        Brief Hospital Course to date:  Corie Kessler is a 89 y.o. female w/ HTN, neuropathy, who lives independently at home w/ daily checks by family who acutely had weakness in her legs while trying to use the commode, unable to get up overnight and pressed life alert; complaining of dysuria w/ abnormal UA    Assessment/Plan    Acute E.coli UTI  Generalized weakness  -UrCx w/ 50,000 CFU E. Coli, she is symptomatic from the same  -cont CTX  -PT/OT, family requesting rehab placement, CM updated  -Melatonin for insomnia    HTN  -Uptitrating home Lopressor and lisinopril to home dose was    Chronic neuropathy  - home gabapentin    Expected Discharge Location and Transportation: home w/ HH/family assist  Expected Discharge 1/2  Expected Discharge Date and Time     Expected Discharge Date Expected Discharge Time    Dec 31, 2022            DVT prophylaxis:  Medical DVT prophylaxis orders are present.     AM-PAC 6 Clicks Score (PT): 16 (12/28/22 1608)    CODE STATUS:   Code Status and Medical Interventions:   Ordered at: 12/28/22 1458     Medical Intervention Limits:    NO intubation (DNI)     Code Status (Patient has no pulse and is not breathing):    No CPR (Do Not Attempt to Resuscitate)     Medical Interventions (Patient has pulse or is breathing):    Limited Support     Comments:    Per H&P patient is DNR     Release to patient:    Routine Release       Nicolas Still, DO  12/29/22

## 2022-12-29 NOTE — CASE MANAGEMENT/SOCIAL WORK
Continued Stay Note  [facility]     Patient Name: Corie Kessler  MRN: [id number]  Today's Date: 12/29/2022    Admit Date: 12/27/2022    Plan: Rehab   Discharge Plan     Row Name 12/29/22 1606       Plan    Plan Rehab    Plan Comments I spoke with patient's daughter over the phone in regards to discharge planning. She would like to see her mom receive some rehab prior to returning home. She wants to discuss facility options with me and will call me tomorrow.    Final Discharge Disposition Code 03 - skilled nursing facility (SNF)               Discharge Codes    No documentation.               Expected Discharge Date and Time     Expected Discharge Date Expected Discharge Time    Dec 31, 2022             Nimisha Márquez RN

## 2022-12-30 PROCEDURE — 96366 THER/PROPH/DIAG IV INF ADDON: CPT

## 2022-12-30 PROCEDURE — G0378 HOSPITAL OBSERVATION PER HR: HCPCS

## 2022-12-30 PROCEDURE — 25010000002 ENOXAPARIN PER 10 MG: Performed by: INTERNAL MEDICINE

## 2022-12-30 PROCEDURE — 99225 PR SBSQ OBSERVATION CARE/DAY 25 MINUTES: CPT | Performed by: NURSE PRACTITIONER

## 2022-12-30 PROCEDURE — 25010000002 CEFTRIAXONE PER 250 MG: Performed by: INTERNAL MEDICINE

## 2022-12-30 PROCEDURE — 96372 THER/PROPH/DIAG INJ SC/IM: CPT

## 2022-12-30 RX ORDER — METOPROLOL TARTRATE 100 MG/1
100 TABLET ORAL EVERY 12 HOURS SCHEDULED
Status: DISCONTINUED | OUTPATIENT
Start: 2022-12-30 | End: 2023-01-03 | Stop reason: HOSPADM

## 2022-12-30 RX ADMIN — ENOXAPARIN SODIUM 30 MG: 30 INJECTION SUBCUTANEOUS at 17:09

## 2022-12-30 RX ADMIN — METOPROLOL TARTRATE 100 MG: 100 TABLET, FILM COATED ORAL at 09:16

## 2022-12-30 RX ADMIN — ACETAMINOPHEN 650 MG: 325 TABLET ORAL at 13:37

## 2022-12-30 RX ADMIN — CASTOR OIL AND BALSAM, PERU 1 APPLICATION: 788; 87 OINTMENT TOPICAL at 21:12

## 2022-12-30 RX ADMIN — CASTOR OIL AND BALSAM, PERU 1 APPLICATION: 788; 87 OINTMENT TOPICAL at 09:17

## 2022-12-30 RX ADMIN — Medication 10 ML: at 21:20

## 2022-12-30 RX ADMIN — TIMOLOL MALEATE: 5 SOLUTION OPHTHALMIC at 21:12

## 2022-12-30 RX ADMIN — LISINOPRIL 40 MG: 20 TABLET ORAL at 09:16

## 2022-12-30 RX ADMIN — Medication 10 ML: at 09:18

## 2022-12-30 RX ADMIN — GABAPENTIN 300 MG: 300 CAPSULE ORAL at 21:10

## 2022-12-30 RX ADMIN — GABAPENTIN 300 MG: 300 CAPSULE ORAL at 09:16

## 2022-12-30 RX ADMIN — FLUPHENAZINE HYDROCHLORIDE 2.5 MG: 5 TABLET, FILM COATED ORAL at 09:17

## 2022-12-30 RX ADMIN — TIMOLOL MALEATE: 5 SOLUTION OPHTHALMIC at 09:17

## 2022-12-30 RX ADMIN — ACETAMINOPHEN 650 MG: 325 TABLET ORAL at 21:11

## 2022-12-30 RX ADMIN — METOPROLOL TARTRATE 100 MG: 100 TABLET, FILM COATED ORAL at 21:10

## 2022-12-30 RX ADMIN — SODIUM CHLORIDE 1 G: 900 INJECTION INTRAVENOUS at 12:42

## 2022-12-30 RX ADMIN — GABAPENTIN 300 MG: 300 CAPSULE ORAL at 17:09

## 2022-12-30 RX ADMIN — Medication 5 MG: at 21:10

## 2022-12-30 NOTE — CASE MANAGEMENT/SOCIAL WORK
Continued Stay Note  [facility]     Patient Name: Corie Kessler  MRN: [id number]  Today's Date: 12/30/2022    Admit Date: 12/27/2022    Plan: Rehab   Discharge Plan     Row Name 12/30/22 0932       Plan    Plan Rehab    Plan Comments Patient's daughter called me and we reviewed list of local SNF's with referrals to [facility], [facility] and [facility] being decided upon. Referrals will be given to them. I told daughter, it may not happen over week end due to holiday.    Final Discharge Disposition Code 03 - skilled nursing facility (SNF)               Discharge Codes    No documentation.               Expected Discharge Date and Time     Expected Discharge Date Expected Discharge Time    Esteban 3, 2023             Nimisha Márquez RN

## 2022-12-30 NOTE — PROGRESS NOTES
[facility] Medicine Services  PROGRESS NOTE    Patient Name: Corie Kessler  : 1922  MRN: [id number]    Date of Admission: 2022  Primary Care Physician: Sweta Nagel MD    Subjective   Subjective     CC:  Follow-up UTI    HPI:  Patient seen resting in bed eating breakfast in no apparent distress. No acute overnight per nursing. She is very pleasant. Continues to await rehab placement. No new complaints at this time.     ROS:  Gen- No fevers, chills  CV- No chest pain, palpitations  Resp- No cough, dyspnea  GI- No N/V/D, abd pain    Objective   Objective     Vital Signs:   Temp:  [97.5 °F (36.4 °C)-98.6 °F (37 °C)] 98.6 °F (37 °C)  Heart Rate:  [] 70  Resp:  [16-18] 18  BP: (131-189)/(71-91) 131/82     Physical Exam:  Constitutional: No acute distress, awake, alert, chronically ill-appearing   HENT: NCAT, mucous membranes moist, [place]   Respiratory: grossly clear, diminished in bases, respiratory effort normal on RA   Cardiovascular: RRR, no murmurs, cap refill brisk   Gastrointestinal: Positive bowel sounds, soft, nontender, nondistended  Musculoskeletal: No BLE edema   Psychiatric: flat affect, cooperative  Neurologic: alert, moves all extremities, speech clear  Skin: warm, dry, no visible rash     Results Reviewed:  LAB RESULTS:      Lab 22  0654 22  1031   WBC 5.60 9.73   HEMOGLOBIN 11.8* 13.7   HEMATOCRIT 35.5 40.6   PLATELETS 188 235   NEUTROS ABS 3.82 8.14*   IMMATURE GRANS (ABS) 0.03 0.05   LYMPHS ABS 0.96 0.70   MONOS ABS 0.54 0.82   EOS ABS 0.20 0.00   MCV 93.7 92.9   SED RATE  --  46*   CRP  --  3.31*   LACTATE  --  1.8         Lab 22  0654 22  1031   SODIUM 139 137   POTASSIUM 3.7 3.6   CHLORIDE 110* 103   CO2 18.0* 20.0*   ANION GAP 11.0 14.0   BUN 19 24*   CREATININE 0.71 0.98   EGFR 76.0 51.6*   GLUCOSE 99 150*   CALCIUM 8.8 9.9*   MAGNESIUM 2.2 2.0   PHOSPHORUS 3.6  --    TSH  --  5.700*         Lab 22  1031   TOTAL  PROTEIN 7.1   ALBUMIN 4.2   GLOBULIN 2.9   ALT (SGPT) 143*   AST (SGOT) 119*   BILIRUBIN 1.1   ALK PHOS 199*         Lab 12/27/22  1031   TROPONIN T <0.010             Lab 12/27/22  1031   VITAMIN B 12 685         Brief Urine Lab Results  (Last result in the past 365 days)      Color   Clarity   Blood   Leuk Est   Nitrite   Protein   CREAT   Urine HCG        12/27/22 1103 Dark Yellow   Turbid   Negative   Small (1+)   Negative   100 mg/dL (2+)                 Microbiology Results Abnormal     Procedure Component Value - Date/Time    Blood Culture - Blood, Arm, Right [[id number]]  (Normal) Collected: 12/27/22 1542    Lab Status: Preliminary result Specimen: Blood from Arm, Right Updated: 12/29/22 1645     Blood Culture No growth at 2 days    Blood Culture - Blood, Hand, Right [[id number]]  (Normal) Collected: 12/27/22 1542    Lab Status: Preliminary result Specimen: Blood from Hand, Right Updated: 12/29/22 1645     Blood Culture No growth at 2 days    COVID-19 and FLU A/B PCR - Swab, Nasopharynx [[id number]]  (Normal) Collected: 12/27/22 1107    Lab Status: Final result Specimen: Swab from Nasopharynx Updated: 12/27/22 1207     COVID19 Not Detected     Influenza A PCR Not Detected     Influenza B PCR Not Detected    Narrative:      Fact sheet for providers: https://www.fda.gov/media/848880/download    Fact sheet for patients: https://www.fda.gov/media/541269/download    Test performed by PCR.          No radiology results from the last 24 hrs        I have reviewed the medications:  Scheduled Meds:castor oil-balsam peru, 1 application, Topical, Q12H  cefTRIAXone, 1 g, Intravenous, Q24H  dorzolamide-timolol, , Both Eyes, BID  enoxaparin, 30 mg, Subcutaneous, Q24H  fluPHENAZine, 2.5 mg, Oral, Daily  gabapentin, 300 mg, Oral, TID  lisinopril, 40 mg, Oral, Q24H  melatonin, 5 mg, Oral, Nightly  metoprolol tartrate, 100 mg, Oral, Q12H  senna-docusate sodium, 2 tablet, Oral, BID  sodium chloride, 10 mL, Intravenous,  Q12H      Continuous Infusions:   PRN Meds:.•  acetaminophen **OR** acetaminophen  •  senna-docusate sodium **AND** polyethylene glycol **AND** bisacodyl **AND** bisacodyl  •  influenza vaccine  •  ondansetron  •  sodium chloride  •  sodium chloride  •  sodium chloride    Assessment & Plan   Assessment & Plan     Active Hospital Problems    Diagnosis  POA   • **UTI (urinary tract infection), bacterial [N39.0, A49.9]  Yes   • Hypertension [I10]  Yes   • Decreased ambulation status [Z74.09]  Yes      Resolved Hospital Problems   No resolved problems to display.        Brief Hospital Course to date:  Corie Kessler is a 89 y.o. female  w/ HTN, neuropathy, who lives independently at home w/ daily checks by family who acutely had weakness in her legs while trying to use the commode, unable to get up overnight and pressed life alert. She presented on 12/27/2022 complaining of dysuria w/ abnormal UA. Urine culture grew 50k E.coli. She was treated with IV Rocephin. PT/OT recommended inpatient rehab. CM is following for placement.      This patient's problems and plans were partially entered by my partner and updated as appropriate by me 12/30/22.    Assessment/Plan     Acute E.coli UTI  Generalized weakness  -UrCx w/ 50,000 CFU E. Coli, she is symptomatic from the same  -cont CTX  -PT/OT recommended inpatient rehab, CM following for placement   -AM labs     HTN  -Continue Metoprolol 100 mg BID, Lisinopril 40 mg daily   -BP currently improved     Chronic neuropathy  - home gabapentin    Insomnia  -Melatonin    Expected Discharge Location and Transportation: home w/ HH/family assist  Expected Discharge 1/2       Expected Discharge Date and Time      Expected Discharge Date Expected Discharge Time     1/2/2023            DVT prophylaxis:  Medical DVT prophylaxis orders are present.     AM-PAC 6 Clicks Score (PT): 16 (12/28/22 6340)    CODE STATUS:   Code Status and Medical Interventions:   Ordered at: 12/28/22 7983     Medical  Intervention Limits:    NO intubation (DNI)     Code Status (Patient has no pulse and is not breathing):    No CPR (Do Not Attempt to Resuscitate)     Medical Interventions (Patient has pulse or is breathing):    Limited Support     Comments:    Per H&P patient is DNR     Release to patient:    Routine Release       Olegario Weir, FEDERICO  12/30/22

## 2022-12-30 NOTE — PLAN OF CARE
Goal Outcome Evaluation:   Normal sinus rhythm to sinus tachycardia. Room air. Patient alert and oriented x4, forgetful. SBP elevated, DO notified & aware, one time dose lisinopril added. No reports of pain. Skin care provided, turned q2. No apparent further needs at this time.

## 2022-12-31 LAB
ANION GAP SERPL CALCULATED.3IONS-SCNC: 9 MMOL/L (ref 5–15)
BUN SERPL-MCNC: 11 MG/DL (ref 8–23)
BUN/CREAT SERPL: 22.9 (ref 7–25)
CALCIUM SPEC-SCNC: 9.2 MG/DL (ref 8.2–9.6)
CHLORIDE SERPL-SCNC: 105 MMOL/L (ref 98–107)
CO2 SERPL-SCNC: 25 MMOL/L (ref 22–29)
CREAT SERPL-MCNC: 0.48 MG/DL (ref 0.57–1)
DEPRECATED RDW RBC AUTO: 49.8 FL (ref 37–54)
EGFRCR SERPLBLD CKD-EPI 2021: 84.7 ML/MIN/1.73
ERYTHROCYTE [DISTWIDTH] IN BLOOD BY AUTOMATED COUNT: 14.3 % (ref 12.3–15.4)
GLUCOSE SERPL-MCNC: 141 MG/DL (ref 65–99)
HCT VFR BLD AUTO: 34.5 % (ref 34–46.6)
HGB BLD-MCNC: 11.3 G/DL (ref 12–15.9)
MCH RBC QN AUTO: 31 PG (ref 26.6–33)
MCHC RBC AUTO-ENTMCNC: 32.8 G/DL (ref 31.5–35.7)
MCV RBC AUTO: 94.5 FL (ref 79–97)
PLATELET # BLD AUTO: 198 10*3/MM3 (ref 140–450)
PMV BLD AUTO: 10.1 FL (ref 6–12)
POTASSIUM SERPL-SCNC: 4.2 MMOL/L (ref 3.5–5.2)
RBC # BLD AUTO: 3.65 10*6/MM3 (ref 3.77–5.28)
SODIUM SERPL-SCNC: 139 MMOL/L (ref 136–145)
WBC NRBC COR # BLD: 4.62 10*3/MM3 (ref 3.4–10.8)

## 2022-12-31 PROCEDURE — 97535 SELF CARE MNGMENT TRAINING: CPT

## 2022-12-31 PROCEDURE — 80048 BASIC METABOLIC PNL TOTAL CA: CPT | Performed by: NURSE PRACTITIONER

## 2022-12-31 PROCEDURE — 99225 PR SBSQ OBSERVATION CARE/DAY 25 MINUTES: CPT | Performed by: NURSE PRACTITIONER

## 2022-12-31 PROCEDURE — 97116 GAIT TRAINING THERAPY: CPT

## 2022-12-31 PROCEDURE — G0378 HOSPITAL OBSERVATION PER HR: HCPCS

## 2022-12-31 PROCEDURE — 25010000002 ENOXAPARIN PER 10 MG: Performed by: INTERNAL MEDICINE

## 2022-12-31 PROCEDURE — 96372 THER/PROPH/DIAG INJ SC/IM: CPT

## 2022-12-31 PROCEDURE — 25010000002 CEFTRIAXONE PER 250 MG: Performed by: INTERNAL MEDICINE

## 2022-12-31 PROCEDURE — 85027 COMPLETE CBC AUTOMATED: CPT | Performed by: NURSE PRACTITIONER

## 2022-12-31 PROCEDURE — 96366 THER/PROPH/DIAG IV INF ADDON: CPT

## 2022-12-31 RX ORDER — SPIRONOLACTONE 25 MG/1
25 TABLET ORAL DAILY
Status: DISCONTINUED | OUTPATIENT
Start: 2022-12-31 | End: 2023-01-03 | Stop reason: HOSPADM

## 2022-12-31 RX ADMIN — ENOXAPARIN SODIUM 30 MG: 30 INJECTION SUBCUTANEOUS at 17:37

## 2022-12-31 RX ADMIN — SENNOSIDES AND DOCUSATE SODIUM 2 TABLET: 50; 8.6 TABLET ORAL at 20:16

## 2022-12-31 RX ADMIN — Medication 10 ML: at 08:26

## 2022-12-31 RX ADMIN — Medication 5 MG: at 20:17

## 2022-12-31 RX ADMIN — FLUPHENAZINE HYDROCHLORIDE 2.5 MG: 5 TABLET, FILM COATED ORAL at 08:25

## 2022-12-31 RX ADMIN — GABAPENTIN 300 MG: 300 CAPSULE ORAL at 08:25

## 2022-12-31 RX ADMIN — ACETAMINOPHEN 650 MG: 325 TABLET ORAL at 20:16

## 2022-12-31 RX ADMIN — TIMOLOL MALEATE: 5 SOLUTION OPHTHALMIC at 20:17

## 2022-12-31 RX ADMIN — Medication 10 ML: at 20:18

## 2022-12-31 RX ADMIN — SODIUM CHLORIDE 1 G: 900 INJECTION INTRAVENOUS at 13:19

## 2022-12-31 RX ADMIN — GABAPENTIN 300 MG: 300 CAPSULE ORAL at 20:16

## 2022-12-31 RX ADMIN — TIMOLOL MALEATE: 5 SOLUTION OPHTHALMIC at 08:21

## 2022-12-31 RX ADMIN — METOPROLOL TARTRATE 100 MG: 100 TABLET, FILM COATED ORAL at 08:25

## 2022-12-31 RX ADMIN — METOPROLOL TARTRATE 100 MG: 100 TABLET, FILM COATED ORAL at 20:16

## 2022-12-31 RX ADMIN — CASTOR OIL AND BALSAM, PERU 1 APPLICATION: 788; 87 OINTMENT TOPICAL at 20:17

## 2022-12-31 RX ADMIN — LISINOPRIL 40 MG: 20 TABLET ORAL at 08:25

## 2022-12-31 RX ADMIN — SPIRONOLACTONE 25 MG: 25 TABLET ORAL at 13:56

## 2022-12-31 RX ADMIN — CASTOR OIL AND BALSAM, PERU 1 APPLICATION: 788; 87 OINTMENT TOPICAL at 08:26

## 2022-12-31 RX ADMIN — GABAPENTIN 300 MG: 300 CAPSULE ORAL at 17:37

## 2022-12-31 NOTE — PLAN OF CARE
Goal Outcome Evaluation:  Plan of Care Reviewed With: patient, family        Progress: improving  Outcome Evaluation: Pt improving with strength and ADL's. Recommend continued skilled OT services and transfer to SNF at d/c.

## 2022-12-31 NOTE — THERAPY TREATMENT NOTE
Patient Name: Corie Kessler  : 1922    MRN: [id number]                              Today's Date: 2022       Admit Date: 2022    Visit Dx:     ICD-10-CM ICD-9-CM   1. Decreased ambulation status  Z74.09 780.99   2. Non-traumatic rhabdomyolysis  M62.82 728.88   3. Hypercalcemia  E83.52 275.42   4. Dehydration  E86.0 276.51   5. LFT elevation  R79.89 790.6   6. Weakness  R53.1 780.79   7. Abnormal urine finding  R82.90 791.9   8. Elevated TSH  R79.89 794.5   9. Pressure injury of buttock, stage 2, unspecified laterality (HCC)  L89.302 707.05     707.22   10. Elevated glucose  R73.09 790.29     Patient Active Problem List   Diagnosis   • Decreased ambulation status   • Hypertension   • UTI (urinary tract infection), bacterial     Past Medical History:   Diagnosis Date   • Hypertension      History reviewed. No pertinent surgical history.   General Information     Row Name 22 1519          OT Time and Intention    Document Type therapy note (daily note)  -     Mode of Treatment occupational therapy  -JR     Row Name 22 151          General Information    Patient Profile Reviewed yes  -JR     Existing Precautions/Restrictions fall  -JR     Barriers to Rehab medically complex  -JR     Row Name 22 151          Cognition    Orientation Status (Cognition) oriented x 3  -JR     Row Name 22 151          Safety Issues, Functional Mobility    Safety Issues Affecting Function (Mobility) awareness of need for assistance;insight into deficits/self-awareness  -JR     Impairments Affecting Function (Mobility) balance;endurance/activity tolerance;strength;postural/trunk control;range of motion (ROM)  -JR           User Key  (r) = Recorded By, (t) = Taken By, (c) = Cosigned By    Initials Name Provider Type    JR Lily Jaquez OT Occupational Therapist                 Mobility/ADL's     Row Name 22 1519          Bed Mobility    Bed Mobility supine-sit  -JR     Supine-Sit  [place] (Bed Mobility) minimum assist (75% patient effort);verbal cues  -     Assistive Device (Bed Mobility) draw sheet;head of bed elevated;bed rails  -     Row Name 12/31/22 1519          Transfers    Transfers sit-stand transfer;toilet transfer  -     Row Name 12/31/22 1519          Bed-Chair Transfer    Bed-Chair [place] (Transfers) minimum assist (75% patient effort);verbal cues  -     Assistive Device (Bed-Chair Transfers) walker, front-wheeled  -     Row Name 12/31/22 1519          Toilet Transfer    Type (Toilet Transfer) sit-stand;stand-sit  -     [place] Level (Toilet Transfer) minimum assist (75% patient effort);verbal cues  -     Assistive Device (Toilet Transfer) commode, bedside without drop arms;walker, front-wheeled  -     Comment, (Toilet Transfer) Pt required verbal and tactile cues for hand placement with transfers.  -     Row Name 12/31/22 1519          Functional Mobility    Functional Mobility- Ind. Level minimum assist (75% patient effort);verbal cues required  -     Functional Mobility- Device walker, front-wheeled  -     Functional Mobility-Distance (Feet) --  within room  -     Functional Mobility- Safety Issues balance decreased during turns;step length decreased;weight-shifting ability decreased;loses balance backward  -     Functional Mobility- Comment followed with chair  -     Row Name 12/31/22 1519          Activities of Daily Living    BADL Assessment/Intervention toileting  -     Row Name 12/31/22 1519          Toileting Assessment/Training    [place] Level (Toileting) adjust/manage clothing;perform perineal hygiene;dependent (less than 25% patient effort)  -     Assistive Devices (Toileting) commode, bedside without drop arms  -     Position (Toileting) supported standing  -           User Key  (r) = Recorded By, (t) = Taken By, (c) = Cosigned By    Initials Name Provider Type    Lily Holguin, OT Occupational  Therapist               Obj/Interventions     Row Name 12/31/22 1521          Balance    Balance Assessment sitting static balance  -JR     Static Sitting Balance contact guard  -JR     Dynamic Standing Balance minimal assist  -JR     Position/Device Used, Standing Balance walker, front-wheeled  -JR           User Key  (r) = Recorded By, (t) = Taken By, (c) = Cosigned By    Initials Name Provider Type    Lily Holguin, OT Occupational Therapist               Goals/Plan    No documentation.                Clinical Impression     Row Name 12/31/22 1522          Pain Assessment    Pretreatment Pain Rating 0/10 - no pain  -     Posttreatment Pain Rating 0/10 - no pain  -[facility] Name 12/31/22 1522          Plan of Care Review    Plan of Care Reviewed With patient;family  -     Progress improving  -     Outcome Evaluation Pt improving with strength and ADL's. Recommend continued skilled OT services and transfer to SNF at d/c.  -[facility] Name 12/31/22 1522          Therapy Plan Review/Discharge Plan (OT)    Anticipated Discharge Disposition (OT) skilled nursing facility  -[facility] Name 12/31/22 1522          Vital Signs    Pre Systolic BP Rehab 175  -JR     Pre Treatment Diastolic BP 89  -JR     Post Systolic BP Rehab 195  -JR     Post Treatment Diastolic   -JR     Pretreatment Heart Rate (beats/min) 88  -JR     Posttreatment Heart Rate (beats/min) 87  -JR     Pre SpO2 (%) 100  -JR     O2 Delivery Pre Treatment room air  -JR     Post SpO2 (%) 99  -JR     O2 Delivery Post Treatment room air  -JR     Pre Patient Position Supine  -JR     Intra Patient Position Standing  -JR     Post Patient Position Sitting  -JR     Row Name 12/31/22 1522          Positioning and Restraints    Pre-Treatment Position in bed  -JR     Post Treatment Position chair  -JR     In Chair notified nsg;reclined;call light within reach;encouraged to call for assist;exit alarm on;on mechanical lift sling;waffle cushion;with  family/caregiver;R waffle boot;L waffle boot  -JR           User Key  (r) = Recorded By, (t) = Taken By, (c) = Cosigned By    Initials Name Provider Type    Lily Holguin, OT Occupational Therapist               Outcome Measures     Row Name 12/31/22 1523          How much help from another is currently needed...    Putting on and taking off regular lower body clothing? 1  -JR     Bathing (including washing, rinsing, and drying) 2  -JR     Toileting (which includes using toilet bed pan or urinal) 1  -JR     Putting on and taking off regular upper body clothing 2  -JR     Taking care of personal grooming (such as brushing teeth) 3  -JR     Eating meals 3  -JR     AM-PAC 6 Clicks Score (OT) 12  -JR     Row Name 12/31/22 1520          How much help from another person do you currently need...    Turning from your back to your side while in flat bed without using bedrails? 3  -SJ     Moving from lying on back to sitting on the side of a flat bed without bedrails? 3  -SJ     Moving to and from a bed to a chair (including a wheelchair)? 3  -SJ     Standing up from a chair using your arms (e.g., wheelchair, bedside chair)? 3  -SJ     Climbing 3-5 steps with a railing? 2  -SJ     To walk in hospital room? 3  -SJ     AM-PAC 6 Clicks Score (PT) 17  -SJ     Highest level of mobility 5 --> Static standing  -SJ     Row Name 12/31/22 1523 12/31/22 1520       Functional Assessment    Outcome Measure Options AM-PAC 6 Clicks Daily Activity (OT)  - AM-PAC 6 Clicks Basic Mobility (PT)  -SJ          User Key  (r) = Recorded By, (t) = Taken By, (c) = Cosigned By    Initials Name Provider Type    Tessa Pedersen PT Physical Therapist    Lily Holguin OT Occupational Therapist                Occupational Therapy Education     Title: PT OT SLP Therapies (In Progress)     Topic: Occupational Therapy (In Progress)     Point: ADL training (In Progress)     Description:   Instruct learner(s) on proper safety adaptation  and remediation techniques during self care or transfers.   Instruct in proper use of assistive devices.              Learning Progress Summary           Patient Acceptance, E, NR by  at 12/31/2022 1440    Acceptance, E,D, VU,DU by  at 12/28/2022 0947   Family Acceptance, E, NR by  at 12/31/2022 1440                   Point: Home exercise program (Not Started)     Description:   Instruct learner(s) on appropriate technique for monitoring, assisting and/or progressing therapeutic exercises/activities.              Learner Progress:  Not documented in this visit.          Point: Precautions (Done)     Description:   Instruct learner(s) on prescribed precautions during self-care and functional transfers.              Learning Progress Summary           Patient Acceptance, E,D, VU,DU by  at 12/28/2022 0947                   Point: Body mechanics (Done)     Description:   Instruct learner(s) on proper positioning and spine alignment during self-care, functional mobility activities and/or exercises.              Learning Progress Summary           Patient Acceptance, E,D, VU,DU by  at 12/28/2022 0947                               User Key     Initials Effective Dates Name Provider Type Discipline     06/16/21 -  Lily Jaquez, OT Occupational Therapist OT     06/16/21 -  Mj Tyler, OT Occupational Therapist OT              OT Recommendation and Plan     Plan of Care Review  Plan of Care Reviewed With: patient, family  Progress: improving  Outcome Evaluation: Pt improving with strength and ADL's. Recommend continued skilled OT services and transfer to SNF at d/c.     Time Calculation:    Time Calculation- OT     Row Name 12/31/22 1524 12/31/22 1521          Time Calculation- OT    OT Start Time 1440  - --     OT Received On 12/31/22  - --        Timed Charges    72628 - Gait Training Minutes  -- 15  -     15466 - OT Self Care/Mgmt Minutes 12 - --        Total Minutes    Timed Charges Total  Minutes 12  -JR 15  -SJ      Total Minutes 12  -JR 15  -SJ           User Key  (r) = Recorded By, (t) = Taken By, (c) = Cosigned By    Initials Name Provider Type    Tessa Pedersen, MIO Physical Therapist     Lily Jaquez OT Occupational Therapist              Therapy Charges for Today     Code Description Service Date Service Provider Modifiers Qty    [id number] HC OT SELF CARE/MGMT/TRAIN EA 15 MIN 12/31/2022 Lily Jaquez OT GO 1               Lily Jaquez OT  12/31/2022

## 2022-12-31 NOTE — PROGRESS NOTES
[facility] Medicine Services  PROGRESS NOTE    Patient Name: Corie Kessler  : 1922  MRN: [id number]    Date of Admission: 2022  Primary Care Physician: Sweta Nagel MD    Subjective   Subjective     CC:  Follow up UTI     HPI:  Patient is sitting up in bed in NAD. She is trying to call her daughter but having trouble seeing without her glasses. She states she slept well.,     ROS:  Gen- No fevers, chills  CV- No chest pain, palpitations  Resp- No cough, dyspnea  GI- No N/V/D, abd pain        Objective   Objective     Vital Signs:   Temp:  [97.8 °F (36.6 °C)-98.7 °F (37.1 °C)] 97.9 °F (36.6 °C)  Heart Rate:  [59-88] 80  Resp:  [18] 18  BP: (117-186)/(54-92) 186/92     Physical Exam:  Constitutional: No acute distress, awake, alert, looks younger than stated age   HENT: NCAT, mucous membranes moist  Respiratory: Clear to auscultation bilaterally, respiratory effort normal room air 100%  Cardiovascular: RRR, no murmurs, rubs, or gallops  Gastrointestinal: Positive bowel sounds, soft, nontender, nondistended  Musculoskeletal: No bilateral ankle edema  Psychiatric: Appropriate affect, cooperative  Neurologic: Oriented x 3, strength symmetric in all extremities, Cranial Nerves grossly intact to confrontation, speech clear  Skin: No rashes      Results Reviewed:  LAB RESULTS:      Lab 22  0703 22  0654 22  1031   WBC 4.62 5.60 9.73   HEMOGLOBIN 11.3* 11.8* 13.7   HEMATOCRIT 34.5 35.5 40.6   PLATELETS 198 188 235   NEUTROS ABS  --  3.82 8.14*   IMMATURE GRANS (ABS)  --  0.03 0.05   LYMPHS ABS  --  0.96 0.70   MONOS ABS  --  0.54 0.82   EOS ABS  --  0.20 0.00   MCV 94.5 93.7 92.9   SED RATE  --   --  46*   CRP  --   --  3.31*   LACTATE  --   --  1.8         Lab 22  0654 22  1031   SODIUM 139 137   POTASSIUM 3.7 3.6   CHLORIDE 110* 103   CO2 18.0* 20.0*   ANION GAP 11.0 14.0   BUN 19 24*   CREATININE 0.71 0.98   EGFR 76.0 51.6*   GLUCOSE 99 150*    CALCIUM 8.8 9.9*   MAGNESIUM 2.2 2.0   PHOSPHORUS 3.6  --    TSH  --  5.700*         Lab 12/27/22  1031   TOTAL PROTEIN 7.1   ALBUMIN 4.2   GLOBULIN 2.9   ALT (SGPT) 143*   AST (SGOT) 119*   BILIRUBIN 1.1   ALK PHOS 199*         Lab 12/27/22  1031   TROPONIN T <0.010             Lab 12/27/22  1031   VITAMIN B 12 685         Brief Urine Lab Results  (Last result in the past 365 days)      Color   Clarity   Blood   Leuk Est   Nitrite   Protein   CREAT   Urine HCG        12/27/22 1103 Dark Yellow   Turbid   Negative   Small (1+)   Negative   100 mg/dL (2+)                 Microbiology Results Abnormal     Procedure Component Value - Date/Time    Blood Culture - Blood, Arm, Right [[id number]]  (Normal) Collected: 12/27/22 1542    Lab Status: Preliminary result Specimen: Blood from Arm, Right Updated: 12/30/22 1645     Blood Culture No growth at 3 days    Blood Culture - Blood, Hand, Right [[id number]]  (Normal) Collected: 12/27/22 1542    Lab Status: Preliminary result Specimen: Blood from Hand, Right Updated: 12/30/22 1645     Blood Culture No growth at 3 days    COVID-19 and FLU A/B PCR - Swab, Nasopharynx [[id number]]  (Normal) Collected: 12/27/22 1107    Lab Status: Final result Specimen: Swab from Nasopharynx Updated: 12/27/22 1207     COVID19 Not Detected     Influenza A PCR Not Detected     Influenza B PCR Not Detected    Narrative:      Fact sheet for providers: https://www.fda.gov/media/792125/download    Fact sheet for patients: https://www.fda.gov/media/524441/download    Test performed by PCR.          No radiology results from the last 24 hrs        I have reviewed the medications:  Scheduled Meds:castor oil-balsam peru, 1 application, Topical, Q12H  cefTRIAXone, 1 g, Intravenous, Q24H  dorzolamide-timolol, , Both Eyes, BID  enoxaparin, 30 mg, Subcutaneous, Q24H  fluPHENAZine, 2.5 mg, Oral, Daily  gabapentin, 300 mg, Oral, TID  lisinopril, 40 mg, Oral, Q24H  melatonin, 5 mg, Oral, Nightly  metoprolol  tartrate, 100 mg, Oral, Q12H  senna-docusate sodium, 2 tablet, Oral, BID  sodium chloride, 10 mL, Intravenous, Q12H      Continuous Infusions:   PRN Meds:.•  acetaminophen **OR** acetaminophen  •  senna-docusate sodium **AND** polyethylene glycol **AND** bisacodyl **AND** bisacodyl  •  influenza vaccine  •  ondansetron  •  sodium chloride  •  sodium chloride  •  sodium chloride    Assessment & Plan   Assessment & Plan     Active Hospital Problems    Diagnosis  POA   • **UTI (urinary tract infection), bacterial [N39.0, A49.9]  Yes   • Hypertension [I10]  Yes   • Decreased ambulation status [Z74.09]  Yes      Resolved Hospital Problems   No resolved problems to display.        Brief Hospital Course to date:  Corie Kessler is a 89 y.o. female w/ HTN, neuropathy, who lives independently at home w/ daily checks by family who acutely had weakness in her legs while trying to use the commode, unable to get up overnight and pressed life alert. She presented on 12/27/2022 complaining of dysuria w/ abnormal UA. Urine culture grew 50k E.coli. She was treated with IV Rocephin. PT/OT recommended inpatient rehab. CM is following for placement.      This patient's problems and plans were partially entered by my partner and updated as appropriate by me 12/31/22.     Assessment/Plan     Acute E.coli UTI  Generalized weakness  -UrCx w/ 50,000 CFU E. Coli, she is symptomatic from the same  -cont CTX  -PT/OT recommended inpatient rehab, CM following for placement      HTN  -Continue Metoprolol 100 mg BID, Lisinopril 40 mg daily   -BP currently improved      Chronic neuropathy  - home gabapentin     Insomnia  -Melatonin       Expected Discharge Location and Transportation: rehab   Expected Discharge 1/2  Expected Discharge Date and Time     Expected Discharge Date Expected Discharge Time    Esteban 3, 2023            DVT prophylaxis:  Medical DVT prophylaxis orders are present.     AM-PAC 6 Clicks Score (PT): 16 (12/28/22 1863)    CODE  STATUS:   Code Status and Medical Interventions:   Ordered at: 12/28/22 1458     Medical Intervention Limits:    NO intubation (DNI)     Code Status (Patient has no pulse and is not breathing):    No CPR (Do Not Attempt to Resuscitate)     Medical Interventions (Patient has pulse or is breathing):    Limited Support     Comments:    Per H&P patient is DNR     Release to patient:    Routine Release       Mone Danielson, FEDERICO  12/31/22

## 2022-12-31 NOTE — PLAN OF CARE
Goal Outcome Evaluation:   Vitals stable. Patient alert and oriented x4, some intermittent confusion/forgetfulness. Room air. Normal sinus rhythm. Skin care provided, turned q2. Up to chair with 2 assist. Reports of pain to gluteal wound x1, relieved with tylenol. No apparent further needs at this time.

## 2022-12-31 NOTE — PLAN OF CARE
Goal Outcome Evaluation:   Vital signs stable. BP elevated, meds adjusted. Normal sinus rhythm. Room air. Patient alert and oriented x4. Reports of gluteal wound pain x1, relieved with tylenol. Skin care provided, turned q2. Patients glasses are possibly missing, family member reported to security that she had them in the ED, patient had previously told RN she left them at home. No apparent further needs at this time.

## 2022-12-31 NOTE — PLAN OF CARE
Goal Outcome Evaluation:  Plan of Care Reviewed With: patient, daughter        Progress: improving  Outcome Evaluation: Pt t/f sup > sit EOB with Micah, STS with Micah x2, amb forward from EOB to BSC, then into hallway. Pt dem forward flexed posture and needed cues to guide RW. Distance limited by weakness and fatigue. Recliner brought up behind. Pt pleasant and cooperative. Continue POC.

## 2022-12-31 NOTE — THERAPY TREATMENT NOTE
Patient Name: Corie Kessler  : 1922    MRN: [id number]                              Today's Date: 2022       Admit Date: 2022    Visit Dx:     ICD-10-CM ICD-9-CM   1. Decreased ambulation status  Z74.09 780.99   2. Non-traumatic rhabdomyolysis  M62.82 728.88   3. Hypercalcemia  E83.52 275.42   4. Dehydration  E86.0 276.51   5. LFT elevation  R79.89 790.6   6. Weakness  R53.1 780.79   7. Abnormal urine finding  R82.90 791.9   8. Elevated TSH  R79.89 794.5   9. Pressure injury of buttock, stage 2, unspecified laterality (HCC)  L89.302 707.05     707.22   10. Elevated glucose  R73.09 790.29     Patient Active Problem List   Diagnosis   • Decreased ambulation status   • Hypertension   • UTI (urinary tract infection), bacterial     Past Medical History:   Diagnosis Date   • Hypertension      History reviewed. No pertinent surgical history.   General Information     Row Name 22 1441          Physical Therapy Time and Intention    Document Type therapy note (daily note)  -     Mode of Treatment physical therapy  -     Row Name 22 1441          General Information    Existing Precautions/Restrictions fall  -     Row Name 22 1441          Cognition    Orientation Status (Cognition) oriented x 4  -     Row Name 22 1441          Safety Issues, Functional Mobility    Safety Issues Affecting Function (Mobility) insight into deficits/self-awareness;sequencing abilities;safety precaution awareness;problem-solving  -     Impairments Affecting Function (Mobility) balance;endurance/activity tolerance;pain;range of motion (ROM);strength;postural/trunk control  -           User Key  (r) = Recorded By, (t) = Taken By, (c) = Cosigned By    Initials Name Provider Type     Tessa Jackson PT Physical Therapist               Mobility     Row Name 22 1513          Bed Mobility    Supine-Sit [place] (Bed Mobility) minimum assist (75% patient effort);1 person assist;verbal  cues  -SJ     Assistive Device (Bed Mobility) bed rails;head of bed elevated  -SJ     Comment, (Bed Mobility) extra time and effort needed, Micah for trunk and verbal cues to advance hips to EOB  -SJ     Row Name 12/31/22 1513          Transfers    Comment, (Transfers) STS from EOB and BSC with Micah x2, verbal cues for hand placement and sequencing.  -SJ     Row Name 12/31/22 1513          Sit-Stand Transfer    Sit-Stand [place] (Transfers) minimum assist (75% patient effort);2 person assist;verbal cues  -SJ     Assistive Device (Sit-Stand Transfers) walker, front-wheeled  -SJ     Row Name 12/31/22 1513          Gait/Stairs (Locomotion)    [place] Level (Gait) contact guard;2 person assist;verbal cues  -SJ     Assistive Device (Gait) walker, front-wheeled  -SJ     Distance in Feet (Gait) 55ft  -SJ     Deviations/Abnormal Patterns (Gait) bilateral deviations;base of support, narrow;[place] decreased;gait speed decreased  -SJ     Bilateral Gait Deviations forward flexed posture;heel strike decreased  -SJ     Comment, (Gait/Stairs) Pt amb forward from EOB to BSC, then into hallway. Pt dem forward flexed posture and needed cues to guide RW. Distance limited by weakness and fatigue.  -SJ           User Key  (r) = Recorded By, (t) = Taken By, (c) = Cosigned By    Initials Name Provider Type    Tessa Pedersen, PT Physical Therapist               Obj/Interventions    No documentation.                Goals/Plan    No documentation.                Clinical Impression     Row Name 12/31/22 1518          Pain    Pretreatment Pain Rating 0/10 - no pain  -SJ     Posttreatment Pain Rating 0/10 - no pain  -SJ     Row Name 12/31/22 1518          Plan of Care Review    Plan of Care Reviewed With patient;daughter  -     Progress improving  -SJ     Outcome Evaluation Pt t/f sup > sit EOB with Micah, STS with Micah x2, amb forward from EOB to BSC, then into hallway. Pt dem forward flexed posture and needed cues to guide  RW. Distance limited by weakness and fatigue. Recliner brought up behind. Pt pleasant and cooperative. Continue POC.  -SJ     Row Name 12/31/22 1518          Vital Signs    Pre Systolic BP Rehab 175  -SJ     Pre Treatment Diastolic BP 89  -SJ     Post Systolic BP Rehab 195  -SJ     Post Treatment Diastolic   -SJ     Row Name 12/31/22 1518          Positioning and Restraints    Pre-Treatment Position in bed  -SJ     Post Treatment Position chair  -SJ     In Chair notified nsg;reclined;call light within reach;encouraged to call for assist;exit alarm on;waffle cushion;on mechanical lift sling;heels elevated;waffle boot/both  -SJ           User Key  (r) = Recorded By, (t) = Taken By, (c) = Cosigned By    Initials Name Provider Type    Tessa Pedersen PT Physical Therapist               Outcome Measures     Row Name 12/31/22 1520          How much help from another person do you currently need...    Turning from your back to your side while in flat bed without using bedrails? 3  -SJ     Moving from lying on back to sitting on the side of a flat bed without bedrails? 3  -SJ     Moving to and from a bed to a chair (including a wheelchair)? 3  -SJ     Standing up from a chair using your arms (e.g., wheelchair, bedside chair)? 3  -SJ     Climbing 3-5 steps with a railing? 2  -SJ     To walk in hospital room? 3  -SJ     AM-PAC 6 Clicks Score (PT) 17  -SJ     Highest level of mobility 5 --> Static standing  -SJ     Row Name 12/31/22 1520          Functional Assessment    Outcome Measure Options AM-PAC 6 Clicks Basic Mobility (PT)  -SJ           User Key  (r) = Recorded By, (t) = Taken By, (c) = Cosigned By    Initials Name Provider Type    Tessa Pedersen PT Physical Therapist                             Physical Therapy Education     Title: PT OT SLP Therapies (In Progress)     Topic: Physical Therapy (In Progress)     Point: Mobility training (Done)     Learning Progress Summary           Patient JI Loco  VU,NR by  at 12/31/2022 1520    Acceptance, E, VU,NR by  at 12/28/2022 1608   Family Eager, E, VU,NR by  at 12/31/2022 1520                   Point: Home exercise program (Not Started)     Learner Progress:  Not documented in this visit.          Point: Precautions (Done)     Learning Progress Summary           Patient Eager, E, VU,NR by  at 12/31/2022 1520    Acceptance, E, VU,NR by  at 12/28/2022 1608   Family Eager, E, VU,NR by  at 12/31/2022 1520                               User Key     Initials Effective Dates Name Provider Type Discipline     06/16/21 -  Tessa Jackson, MIO Physical Therapist PT    RONI 06/16/21 -  Nicky Bennett PT Physical Therapist PT              PT Recommendation and Plan     Plan of Care Reviewed With: patient, daughter  Progress: improving  Outcome Evaluation: Pt t/f sup > sit EOB with Micah, STS with Micah x2, amb forward from EOB to BSC, then into hallway. Pt dem forward flexed posture and needed cues to guide RW. Distance limited by weakness and fatigue. Recliner brought up behind. Pt pleasant and cooperative. Continue POC.     Time Calculation:    PT Charges     Row Name 12/31/22 1521             Time Calculation    Start Time 1441  -      PT Non-Billable Time (min) 15 min  -      PT Received On 12/31/22  -      PT Goal Re-Cert Due Date 01/07/23  -         Time Calculation- PT    Total Timed Code Minutes- PT 15 minute(s)  -         Timed Charges    26517 - Gait Training Minutes  15  -SJ         Total Minutes    Timed Charges Total Minutes 15  -SJ       Total Minutes 15  -SJ            User Key  (r) = Recorded By, (t) = Taken By, (c) = Cosigned By    Initials Name Provider Type     Tessa Jackson, PT Physical Therapist              Therapy Charges for Today     Code Description Service Date Service Provider Modifiers Qty    [id number] HC GAIT TRAINING EA 15 MIN 12/31/2022 Tessa Jackson PT GP 1          PT G-Codes  Outcome Measure Options: AM-PAC 6  Clicks Basic Mobility (PT)  AM-PAC 6 Clicks Score (PT): 17  AM-PAC 6 Clicks Score (OT): 17  PT Discharge Summary  Anticipated Discharge Disposition (PT): inpatient rehabilitation facility    Tessa Jackson, MIO  12/31/2022

## 2023-01-01 LAB
BACTERIA SPEC AEROBE CULT: NORMAL
BACTERIA SPEC AEROBE CULT: NORMAL

## 2023-01-01 PROCEDURE — 97530 THERAPEUTIC ACTIVITIES: CPT

## 2023-01-01 PROCEDURE — G0378 HOSPITAL OBSERVATION PER HR: HCPCS

## 2023-01-01 PROCEDURE — 25010000002 ENOXAPARIN PER 10 MG: Performed by: INTERNAL MEDICINE

## 2023-01-01 PROCEDURE — 25010000002 CEFTRIAXONE PER 250 MG: Performed by: INTERNAL MEDICINE

## 2023-01-01 PROCEDURE — 99232 SBSQ HOSP IP/OBS MODERATE 35: CPT | Performed by: INTERNAL MEDICINE

## 2023-01-01 PROCEDURE — 96366 THER/PROPH/DIAG IV INF ADDON: CPT

## 2023-01-01 PROCEDURE — 96372 THER/PROPH/DIAG INJ SC/IM: CPT

## 2023-01-01 RX ORDER — DIAZEPAM 2 MG/1
2 TABLET ORAL ONCE
Status: COMPLETED | OUTPATIENT
Start: 2023-01-01 | End: 2023-01-01

## 2023-01-01 RX ADMIN — SODIUM CHLORIDE 1 G: 900 INJECTION INTRAVENOUS at 11:10

## 2023-01-01 RX ADMIN — ACETAMINOPHEN 650 MG: 325 TABLET ORAL at 15:29

## 2023-01-01 RX ADMIN — GABAPENTIN 300 MG: 300 CAPSULE ORAL at 20:48

## 2023-01-01 RX ADMIN — CASTOR OIL AND BALSAM, PERU 1 APPLICATION: 788; 87 OINTMENT TOPICAL at 20:49

## 2023-01-01 RX ADMIN — ENOXAPARIN SODIUM 30 MG: 30 INJECTION SUBCUTANEOUS at 18:32

## 2023-01-01 RX ADMIN — TIMOLOL MALEATE: 5 SOLUTION OPHTHALMIC at 20:48

## 2023-01-01 RX ADMIN — DIAZEPAM 2 MG: 2 TABLET ORAL at 11:10

## 2023-01-01 RX ADMIN — GABAPENTIN 300 MG: 300 CAPSULE ORAL at 16:31

## 2023-01-01 RX ADMIN — Medication 10 ML: at 09:04

## 2023-01-01 RX ADMIN — METOPROLOL TARTRATE 100 MG: 100 TABLET, FILM COATED ORAL at 09:01

## 2023-01-01 RX ADMIN — TIMOLOL MALEATE: 5 SOLUTION OPHTHALMIC at 08:58

## 2023-01-01 RX ADMIN — LISINOPRIL 40 MG: 20 TABLET ORAL at 09:01

## 2023-01-01 RX ADMIN — CASTOR OIL AND BALSAM, PERU 1 APPLICATION: 788; 87 OINTMENT TOPICAL at 08:58

## 2023-01-01 RX ADMIN — GABAPENTIN 300 MG: 300 CAPSULE ORAL at 09:01

## 2023-01-01 RX ADMIN — SPIRONOLACTONE 25 MG: 25 TABLET ORAL at 09:01

## 2023-01-01 RX ADMIN — ACETAMINOPHEN 650 MG: 325 TABLET ORAL at 09:09

## 2023-01-01 RX ADMIN — FLUPHENAZINE HYDROCHLORIDE 2.5 MG: 5 TABLET, FILM COATED ORAL at 09:01

## 2023-01-01 RX ADMIN — Medication 5 MG: at 20:48

## 2023-01-01 RX ADMIN — Medication 10 ML: at 20:48

## 2023-01-01 RX ADMIN — METOPROLOL TARTRATE 100 MG: 100 TABLET, FILM COATED ORAL at 20:48

## 2023-01-01 RX ADMIN — SENNOSIDES AND DOCUSATE SODIUM 2 TABLET: 50; 8.6 TABLET ORAL at 20:48

## 2023-01-01 NOTE — THERAPY TREATMENT NOTE
Patient Name: Corie Kessler  : 1922    MRN: [id number]                              Today's Date: 2023       Admit Date: 2022    Visit Dx:     ICD-10-CM ICD-9-CM   1. Decreased ambulation status  Z74.09 780.99   2. Non-traumatic rhabdomyolysis  M62.82 728.88   3. Hypercalcemia  E83.52 275.42   4. Dehydration  E86.0 276.51   5. LFT elevation  R79.89 790.6   6. Weakness  R53.1 780.79   7. Abnormal urine finding  R82.90 791.9   8. Elevated TSH  R79.89 794.5   9. Pressure injury of buttock, stage 2, unspecified laterality (HCC)  L89.302 707.05     707.22   10. Elevated glucose  R73.09 790.29     Patient Active Problem List   Diagnosis   • Decreased ambulation status   • Hypertension   • UTI (urinary tract infection), bacterial     Past Medical History:   Diagnosis Date   • Hypertension      History reviewed. No pertinent surgical history.   General Information     Row Name 23 1517          Physical Therapy Time and Intention    Document Type therapy note (daily note)  -     Mode of Treatment physical therapy  -     Row Name 23 1517          General Information    Patient Profile Reviewed yes  -     Existing Precautions/Restrictions fall  -     Row Name 23 1517          Cognition    Orientation Status (Cognition) oriented x 3  -     Row Name 23 1517          Safety Issues, Functional Mobility    Safety Issues Affecting Function (Mobility) safety precaution awareness;safety precautions follow-through/compliance;sequencing abilities;positioning of assistive device  -     Impairments Affecting Function (Mobility) balance;endurance/activity tolerance;strength;postural/trunk control;range of motion (ROM)  -           User Key  (r) = Recorded By, (t) = Taken By, (c) = Cosigned By    Initials Name Provider Type     Duran Grant PT Physical Therapist               Mobility     Row Name 23 1518          Bed Mobility    Bed Mobility sit-supine;scooting/bridging  -      Scooting/Bridging [place] (Bed Mobility) maximum assist (25% patient effort);2 person assist;verbal cues;nonverbal cues (demo/gesture)  -     Sit-Supine [place] (Bed Mobility) moderate assist (50% patient effort);2 person assist;verbal cues;nonverbal cues (demo/gesture)  -     Assistive Device (Bed Mobility) draw sheet;head of bed elevated;bed rails  -     Comment, (Bed Mobility) Patient required assist to bilateral lower extremities and posterior trunk to transfer sit to supine in bed.  Patient required max a bed to bed sheet to scoot upwards toward head of bed.  -     Row Name 01/01/23 1518          Transfers    Comment, (Transfers) Verbal cues for bilateral lower extremity foot placement and hand placement for safe walker management for transfers.  -     Row Name 01/01/23 1518          Bed-Chair Transfer    Bed-Chair [place] (Transfers) moderate assist (50% patient effort);2 person assist;verbal cues;nonverbal cues (demo/gesture)  -     Assistive Device (Bed-Chair Transfers) other (see comments)  Bilateral upper extremities  -     Comment, (Bed-Chair Transfer) No assistive device used for bed to chair transfer, stand pivot performed  -     Row Name 01/01/23 1518          Sit-Stand Transfer    Sit-Stand [place] (Transfers) minimum assist (75% patient effort);2 person assist;verbal cues  -     Assistive Device (Sit-Stand Transfers) walker, front-wheeled  -     Row Name 01/01/23 1518          Gait/Stairs (Locomotion)    [place] Level (Gait) minimum assist (75% patient effort);1 person assist;verbal cues;nonverbal cues (demo/gesture)  -     Assistive Device (Gait) walker, front-wheeled  -     Distance in Feet (Gait) 20  -     Deviations/Abnormal Patterns (Gait) bilateral deviations;base of support, narrow;burton decreased;gait speed decreased  -     Bilateral Gait Deviations forward flexed posture;heel strike decreased  -     Comment, (Gait/Stairs) Performed  standing marching in place within rolling walker prior to ambulation.  Patient ambulates with slow gait pattern and forward flexed posture requiring verbal cues for upright posture and forward gaze.  Patient demonstrates small shuffling steps needing cues for wider base of support stepping patterns.  Chair follow cues for safety.  -           User Key  (r) = Recorded By, (t) = Taken By, (c) = Cosigned By    Initials Name Provider Type    Duran Dave PT Physical Therapist               Obj/Interventions     Row Name 01/01/23 1520          Motor Skills    Therapeutic Exercise other (see comments)  Ankle pumps with assisted range of motion X 10  -     Row Name 01/01/23 1520          Balance    Balance Assessment standing static balance;standing dynamic balance  -     Static Standing Balance minimal assist  -     Dynamic Standing Balance minimal assist  -     Position/Device Used, Standing Balance supported;walker, rolling  -     Balance Interventions sitting;standing;sit to stand;supported;static;dynamic;minimal challenge  -     Comment, Balance Patient requires rolling walker and assist for standing balance  -           User Key  (r) = Recorded By, (t) = Taken By, (c) = Cosigned By    Initials Name Provider Type     Duran Grant, PT Physical Therapist               Goals/Plan    No documentation.                Clinical Impression     Row Name 01/01/23 1520          Pain    Pain Location - Side/Orientation Right  -     Pain Location generalized  -     Pain Location - shoulder  -     Pain Intervention(s) Repositioned;Ambulation/increased activity  -     Row Name 01/01/23 1520          Plan of Care Review    Plan of Care Reviewed With patient  -     Progress improving  -     Outcome Evaluation Patient required mod A X 2 for bed mobility, min A  X2 for sit to stand transfers, and ambulated 20 feet with min assist and chair follow.  Continue discharge recommendations for SNF.  -      Row Name 01/01/23 1520          Therapy Assessment/Plan (PT)    Rehab Potential (PT) good, to achieve stated therapy goals  -     Criteria for Skilled Interventions Met (PT) yes;meets criteria;skilled treatment is necessary  -     Therapy Frequency (PT) daily  -[facility] Name 01/01/23 1520          Vital Signs    Pre Patient Position Sitting  -     Intra Patient Position Standing  -     Post Patient Position Supine  -[facility] Name 01/01/23 1520          Positioning and Restraints    Pre-Treatment Position sitting in chair/recliner  -     Post Treatment Position bed  -     In Bed notified nsg;call light within reach;encouraged to call for assist;exit alarm on;fowlers;with family/caregiver  -           User Key  (r) = Recorded By, (t) = Taken By, (c) = Cosigned By    Initials Name Provider Type    Duran Dave PT Physical Therapist               Outcome Measures     Row Name 01/01/23 1522          How much help from another person do you currently need...    Turning from your back to your side while in flat bed without using bedrails? 3  -JH     Moving from lying on back to sitting on the side of a flat bed without bedrails? 2  -JH     Moving to and from a bed to a chair (including a wheelchair)? 2  -JH     Standing up from a chair using your arms (e.g., wheelchair, bedside chair)? 2  -JH     Climbing 3-5 steps with a railing? 1  -JH     To walk in hospital room? 2  -     AM-PAC 6 Clicks Score (PT) 12  -     Highest level of mobility 4 --> Transferred to chair/commode  -[facility] Name 01/01/23 1522          Functional Assessment    Outcome Measure Options AM-PAC 6 Clicks Basic Mobility (PT)  -           User Key  (r) = Recorded By, (t) = Taken By, (c) = Cosigned By    Initials Name Provider Type    Duran Dave PT Physical Therapist                             Physical Therapy Education     Title: PT OT SLP Therapies (In Progress)     Topic: Physical Therapy (Done)     Point:  Mobility training (Done)     Learning Progress Summary           Patient Acceptance, E,TB, VU by  at 1/1/2023 1522    Comment: Reviewed safety with mobility    Eager, E, VU,NR by  at 12/31/2022 1520    Acceptance, E, VU,NR by  at 12/28/2022 1608   Family Eager, E, VU,NR by  at 12/31/2022 1520                   Point: Home exercise program (Done)     Learning Progress Summary           Patient Acceptance, E,TB, VU by  at 1/1/2023 1522    Comment: Reviewed safety with mobility                   Point: Precautions (Done)     Learning Progress Summary           Patient Acceptance, E,TB, VU by  at 1/1/2023 1522    Comment: Reviewed safety with mobility    Eager, E, VU,NR by  at 12/31/2022 1520    Acceptance, E, VU,NR by  at 12/28/2022 1608   Family Eager, E, VU,NR by  at 12/31/2022 1520                               User Key     Initials Effective Dates Name Provider Type Discipline     06/16/21 -  Tessa Jackson, PT Physical Therapist PT     06/16/21 -  Nicky Bennett, MIO Physical Therapist PT     09/22/20 -  Duran Grant PT Physical Therapist PT              PT Recommendation and Plan     Plan of Care Reviewed With: patient  Progress: improving  Outcome Evaluation: Patient required mod A X 2 for bed mobility, min A  X2 for sit to stand transfers, and ambulated 20 feet with min assist and chair follow.  Continue discharge recommendations for SNF.     Time Calculation:    PT Charges     Row Name 01/01/23 1523             Time Calculation    Start Time 1445  -JH      PT Received On 01/01/23  -      PT Goal Re-Cert Due Date 01/07/23  -         Timed Charges    32327 - PT Therapeutic Activity Minutes 26  -         Total Minutes    Timed Charges Total Minutes 26  -       Total Minutes 26  -JH            User Key  (r) = Recorded By, (t) = Taken By, (c) = Cosigned By    Initials Name Provider Type     Duran Grant, PT Physical Therapist              Therapy Charges for Today     Code  Description Service Date Service Provider Modifiers Qty    [id number]  PT THERAPEUTIC ACT EA 15 MIN 1/1/2023 Duran Grant, PT GP 2          PT G-Codes  Outcome Measure Options: AM-PAC 6 Clicks Basic Mobility (PT)  AM-PAC 6 Clicks Score (PT): 12  AM-PAC 6 Clicks Score (OT): 12  PT Discharge Summary  Anticipated Discharge Disposition (PT): inpatient rehabilitation facility    Duran Grant, PT  1/1/2023

## 2023-01-01 NOTE — PLAN OF CARE
Goal Outcome Evaluation:   Patient noted to be in bed during bedside shift report. No new complaints of pain or discomfort noted. Patient turned q2h per staff assistance. Wound care completed per nurse. Purewick in use overnight. Call light is within reach. Bed alarm is on.         Progress: no change

## 2023-01-01 NOTE — PROGRESS NOTES
[facility] Medicine Services  PROGRESS NOTE    Patient Name: Corie Kessler  : 1922  MRN: [id number]    Date of Admission: 2022  Primary Care Physician: Sweta Nagel MD    Subjective   Subjective     CC:  Hypertension    HPI:  Moved to the bedside chair this morning, states she likes sitting up in a chair better than being in bed.  Blood pressures been elevated, she states home health blood pressures are usually well controlled, thinks that she is just anxious and stressed from being in the hospital    ROS:  Gen- No fevers, chills  CV- No chest pain, palpitations  Resp- No cough, dyspnea  GI- No N/V/D, abd pain     Objective   Objective     Vital Signs:   Temp:  [98 °F (36.7 °C)-98.7 °F (37.1 °C)] 98 °F (36.7 °C)  Heart Rate:  [56-99] 81  Resp:  [16] 16  BP: (125-194)/(52-95) 185/81     Physical Exam:  Constitutional: Awake, alert, NAD, elderly female sitting up in bedside chair  HENT: NCAT, mucous membranes moist  Respiratory: Clear to auscultation bilaterally, respiratory effort normal   Cardiovascular: RRR, palpable radial pulses  Gastrointestinal: Positive bowel sounds, soft, nontender, nondistended  Musculoskeletal: No bilateral ankle edema  Psychiatric: Appropriate affect, cooperative  Neurologic: Hard of hearing, speech clear and fluent    Results Reviewed:  LAB RESULTS:      Lab 22  0703 22  0654 22  1031   WBC 4.62 5.60 9.73   HEMOGLOBIN 11.3* 11.8* 13.7   HEMATOCRIT 34.5 35.5 40.6   PLATELETS 198 188 235   NEUTROS ABS  --  3.82 8.14*   IMMATURE GRANS (ABS)  --  0.03 0.05   LYMPHS ABS  --  0.96 0.70   MONOS ABS  --  0.54 0.82   EOS ABS  --  0.20 0.00   MCV 94.5 93.7 92.9   SED RATE  --   --  46*   CRP  --   --  3.31*   LACTATE  --   --  1.8         Lab 22  1404 22  0654 22  1031   SODIUM 139 139 137   POTASSIUM 4.2 3.7 3.6   CHLORIDE 105 110* 103   CO2 25.0 18.0* 20.0*   ANION GAP 9.0 11.0 14.0   BUN 11 19 24*   CREATININE  0.48* 0.71 0.98   EGFR 84.7 76.0 51.6*   GLUCOSE 141* 99 150*   CALCIUM 9.2 8.8 9.9*   MAGNESIUM  --  2.2 2.0   PHOSPHORUS  --  3.6  --    TSH  --   --  5.700*         Lab 12/27/22  1031   TOTAL PROTEIN 7.1   ALBUMIN 4.2   GLOBULIN 2.9   ALT (SGPT) 143*   AST (SGOT) 119*   BILIRUBIN 1.1   ALK PHOS 199*         Lab 12/27/22  1031   TROPONIN T <0.010             Lab 12/27/22  1031   VITAMIN B 12 685         Brief Urine Lab Results  (Last result in the past 365 days)      Color   Clarity   Blood   Leuk Est   Nitrite   Protein   CREAT   Urine HCG        12/27/22 1103 Dark Yellow   Turbid   Negative   Small (1+)   Negative   100 mg/dL (2+)                 Microbiology Results Abnormal     Procedure Component Value - Date/Time    Blood Culture - Blood, Arm, Right [[id number]]  (Normal) Collected: 12/27/22 1542    Lab Status: Preliminary result Specimen: Blood from Arm, Right Updated: 12/31/22 1645     Blood Culture No growth at 4 days    Blood Culture - Blood, Hand, Right [[id number]]  (Normal) Collected: 12/27/22 1542    Lab Status: Preliminary result Specimen: Blood from Hand, Right Updated: 12/31/22 1645     Blood Culture No growth at 4 days    COVID-19 and FLU A/B PCR - Swab, Nasopharynx [[id number]]  (Normal) Collected: 12/27/22 1107    Lab Status: Final result Specimen: Swab from Nasopharynx Updated: 12/27/22 1207     COVID19 Not Detected     Influenza A PCR Not Detected     Influenza B PCR Not Detected    Narrative:      Fact sheet for providers: https://www.fda.gov/media/687760/download    Fact sheet for patients: https://www.fda.gov/media/038572/download    Test performed by PCR.          No radiology results from the last 24 hrs        I have reviewed the medications:  Scheduled Meds:castor oil-balsam peru, 1 application, Topical, Q12H  cefTRIAXone, 1 g, Intravenous, Q24H  dorzolamide-timolol, , Both Eyes, BID  enoxaparin, 30 mg, Subcutaneous, Q24H  fluPHENAZine, 2.5 mg, Oral, Daily  gabapentin, 300 mg, Oral,  TID  lisinopril, 40 mg, Oral, Q24H  melatonin, 5 mg, Oral, Nightly  metoprolol tartrate, 100 mg, Oral, Q12H  senna-docusate sodium, 2 tablet, Oral, BID  sodium chloride, 10 mL, Intravenous, Q12H  spironolactone, 25 mg, Oral, Daily      Continuous Infusions:   PRN Meds:.•  acetaminophen **OR** acetaminophen  •  senna-docusate sodium **AND** polyethylene glycol **AND** bisacodyl **AND** bisacodyl  •  influenza vaccine  •  ondansetron  •  sodium chloride  •  sodium chloride  •  sodium chloride    Assessment & Plan   Assessment & Plan     Active Hospital Problems    Diagnosis  POA   • **UTI (urinary tract infection), bacterial [N39.0, A49.9]  Yes   • Hypertension [I10]  Yes   • Decreased ambulation status [Z74.09]  Yes      Resolved Hospital Problems   No resolved problems to display.        Brief Hospital Course to date:  Corie Kessler is a 89 y.o. female w/ HTN, neuropathy, who lives independently at home w/ daily checks by family who acutely had weakness in her legs while trying to use the commode, unable to get up overnight and pressed life alert; complaining of dysuria w/ abnormal UA    Assessment/Plan    Acute E.coli UTI  Generalized weakness  -UrCx w/ 50,000 CFU E. Coli, she is symptomatic from the same  -S/p x6 doses IV ceftriaxone  -Case management follows for rehab  -Melatonin for insomnia    HTN  -cont metoprolol tartrate 100 mg bid, lisinopril 40 mg daily, spironolactone 25 mg daily  - Patient had previously reported drinking a glass of bourbon every night before bed, spikes in BP may be related to partial withdrawal, additionally may be from stress/anxiety, will give low-dose oral Valium and monitor BP; due to her age we will tolerate slightly more liberal blood pressure targets    Chronic neuropathy  - home gabapentin    Expected Discharge Location and Transportation: Short-term rehab  Expected Discharge 1/3  Expected Discharge Date and Time     Expected Discharge Date Expected Discharge Time    Esteban 3, 2023             DVT prophylaxis:  Medical DVT prophylaxis orders are present.     AM-PAC 6 Clicks Score (PT): 17 (12/31/22 1520)    CODE STATUS:   Code Status and Medical Interventions:   Ordered at: 12/28/22 145     Medical Intervention Limits:    NO intubation (DNI)     Code Status (Patient has no pulse and is not breathing):    No CPR (Do Not Attempt to Resuscitate)     Medical Interventions (Patient has pulse or is breathing):    Limited Support     Comments:    Per H&P patient is DNR     Release to patient:    Routine Release       Nicolas Still, DO  01/01/23

## 2023-01-01 NOTE — PLAN OF CARE
Goal Outcome Evaluation:  Plan of Care Reviewed With: patient        Progress: improving  Outcome Evaluation: Patient required mod A X 2 for bed mobility, min A  X2 for sit to stand transfers, and ambulated 20 feet with min assist and chair follow.  Continue discharge recommendations for SNF.

## 2023-01-02 LAB
FLUAV RNA RESP QL NAA+PROBE: NOT DETECTED
FLUBV RNA RESP QL NAA+PROBE: NOT DETECTED
SARS-COV-2 RNA RESP QL NAA+PROBE: NOT DETECTED

## 2023-01-02 PROCEDURE — 97110 THERAPEUTIC EXERCISES: CPT

## 2023-01-02 PROCEDURE — 99232 SBSQ HOSP IP/OBS MODERATE 35: CPT | Performed by: NURSE PRACTITIONER

## 2023-01-02 PROCEDURE — 25010000002 ENOXAPARIN PER 10 MG: Performed by: INTERNAL MEDICINE

## 2023-01-02 PROCEDURE — G0378 HOSPITAL OBSERVATION PER HR: HCPCS

## 2023-01-02 PROCEDURE — 96372 THER/PROPH/DIAG INJ SC/IM: CPT

## 2023-01-02 PROCEDURE — 97116 GAIT TRAINING THERAPY: CPT

## 2023-01-02 PROCEDURE — 87636 SARSCOV2 & INF A&B AMP PRB: CPT | Performed by: NURSE PRACTITIONER

## 2023-01-02 RX ADMIN — Medication 10 ML: at 08:41

## 2023-01-02 RX ADMIN — GABAPENTIN 300 MG: 300 CAPSULE ORAL at 17:15

## 2023-01-02 RX ADMIN — GABAPENTIN 300 MG: 300 CAPSULE ORAL at 19:41

## 2023-01-02 RX ADMIN — METOPROLOL TARTRATE 100 MG: 100 TABLET, FILM COATED ORAL at 08:39

## 2023-01-02 RX ADMIN — CASTOR OIL AND BALSAM, PERU 1 APPLICATION: 788; 87 OINTMENT TOPICAL at 08:39

## 2023-01-02 RX ADMIN — ENOXAPARIN SODIUM 30 MG: 30 INJECTION SUBCUTANEOUS at 17:16

## 2023-01-02 RX ADMIN — TIMOLOL MALEATE: 5 SOLUTION OPHTHALMIC at 08:40

## 2023-01-02 RX ADMIN — ACETAMINOPHEN 650 MG: 325 TABLET ORAL at 19:41

## 2023-01-02 RX ADMIN — FLUPHENAZINE HYDROCHLORIDE 2.5 MG: 5 TABLET, FILM COATED ORAL at 08:40

## 2023-01-02 RX ADMIN — GABAPENTIN 300 MG: 300 CAPSULE ORAL at 08:39

## 2023-01-02 RX ADMIN — SPIRONOLACTONE 25 MG: 25 TABLET ORAL at 08:39

## 2023-01-02 RX ADMIN — Medication 5 MG: at 19:41

## 2023-01-02 RX ADMIN — SENNOSIDES AND DOCUSATE SODIUM 2 TABLET: 50; 8.6 TABLET ORAL at 08:39

## 2023-01-02 RX ADMIN — CASTOR OIL AND BALSAM, PERU 1 APPLICATION: 788; 87 OINTMENT TOPICAL at 19:41

## 2023-01-02 RX ADMIN — TIMOLOL MALEATE: 5 SOLUTION OPHTHALMIC at 19:43

## 2023-01-02 RX ADMIN — METOPROLOL TARTRATE 100 MG: 100 TABLET, FILM COATED ORAL at 19:41

## 2023-01-02 RX ADMIN — LISINOPRIL 40 MG: 20 TABLET ORAL at 08:39

## 2023-01-02 NOTE — PROGRESS NOTES
[facility] Medicine Services  PROGRESS NOTE    Patient Name: Corie Kessler  : 1922  MRN: [id number]    Date of Admission: 2022  Primary Care Physician: Sweta Nagel MD    Subjective   Subjective     CC:  Hypertension    HPI:  Patient up in chair. States she walked in HW this morning. Was a bit dizzy at first but subsided. Still feeling a bit weak. No other overnight issues    ROS:  Gen- No fevers, chills  CV- No chest pain, palpitations  Resp- No cough, dyspnea  GI- No N/V/D, abd pain     Objective   Objective     Vital Signs:   Temp:  [98.1 °F (36.7 °C)-98.3 °F (36.8 °C)] 98.3 °F (36.8 °C)  Heart Rate:  [60-99] 99  Resp:  [15-18] 16  BP: (105-177)/(48-86) 134/61     Physical Exam:  Constitutional: Awake, alert, NAD, sitting up in chair  HENT: NCAT, mucous membranes moist  Respiratory: Clear to auscultation bilaterally, respiratory effort normal   Cardiovascular: RRR  Gastrointestinal: Positive bowel sounds, soft, nontender, nondistended  Musculoskeletal: No bilateral ankle edema  Psychiatric: Appropriate affect, cooperative  Neurologic: LIRA freely, oriented, speech clear    Results Reviewed:  LAB RESULTS:      Lab 22  0703 22  0654 22  1031   WBC 4.62 5.60 9.73   HEMOGLOBIN 11.3* 11.8* 13.7   HEMATOCRIT 34.5 35.5 40.6   PLATELETS 198 188 235   NEUTROS ABS  --  3.82 8.14*   IMMATURE GRANS (ABS)  --  0.03 0.05   LYMPHS ABS  --  0.96 0.70   MONOS ABS  --  0.54 0.82   EOS ABS  --  0.20 0.00   MCV 94.5 93.7 92.9   SED RATE  --   --  46*   CRP  --   --  3.31*   LACTATE  --   --  1.8         Lab 22  1404 22  0654 22  1031   SODIUM 139 139 137   POTASSIUM 4.2 3.7 3.6   CHLORIDE 105 110* 103   CO2 25.0 18.0* 20.0*   ANION GAP 9.0 11.0 14.0   BUN 11 19 24*   CREATININE 0.48* 0.71 0.98   EGFR 84.7 76.0 51.6*   GLUCOSE 141* 99 150*   CALCIUM 9.2 8.8 9.9*   MAGNESIUM  --  2.2 2.0   PHOSPHORUS  --  3.6  --    TSH  --   --  5.700*         Lab  12/27/22  1031   TOTAL PROTEIN 7.1   ALBUMIN 4.2   GLOBULIN 2.9   ALT (SGPT) 143*   AST (SGOT) 119*   BILIRUBIN 1.1   ALK PHOS 199*         Lab 12/27/22  1031   TROPONIN T <0.010             Lab 12/27/22  1031   VITAMIN B 12 685         Brief Urine Lab Results  (Last result in the past 365 days)      Color   Clarity   Blood   Leuk Est   Nitrite   Protein   CREAT   Urine HCG        12/27/22 1103 Dark Yellow   Turbid   Negative   Small (1+)   Negative   100 mg/dL (2+)                 Microbiology Results Abnormal     Procedure Component Value - Date/Time    Blood Culture - Blood, Arm, Right [[id number]]  (Normal) Collected: 12/27/22 1542    Lab Status: Final result Specimen: Blood from Arm, Right Updated: 01/01/23 1646     Blood Culture No growth at 5 days    Blood Culture - Blood, Hand, Right [[id number]]  (Normal) Collected: 12/27/22 1542    Lab Status: Final result Specimen: Blood from Hand, Right Updated: 01/01/23 1646     Blood Culture No growth at 5 days    COVID-19 and FLU A/B PCR - Swab, Nasopharynx [[id number]]  (Normal) Collected: 12/27/22 1107    Lab Status: Final result Specimen: Swab from Nasopharynx Updated: 12/27/22 1207     COVID19 Not Detected     Influenza A PCR Not Detected     Influenza B PCR Not Detected    Narrative:      Fact sheet for providers: https://www.fda.gov/media/285522/download    Fact sheet for patients: https://www.fda.gov/media/859991/download    Test performed by PCR.          No radiology results from the last 24 hrs        I have reviewed the medications:  Scheduled Meds:castor oil-balsam peru, 1 application, Topical, Q12H  dorzolamide-timolol, , Both Eyes, BID  enoxaparin, 30 mg, Subcutaneous, Q24H  fluPHENAZine, 2.5 mg, Oral, Daily  gabapentin, 300 mg, Oral, TID  lisinopril, 40 mg, Oral, Q24H  melatonin, 5 mg, Oral, Nightly  metoprolol tartrate, 100 mg, Oral, Q12H  senna-docusate sodium, 2 tablet, Oral, BID  sodium chloride, 10 mL, Intravenous, Q12H  spironolactone, 25 mg, Oral,  Daily      Continuous Infusions:   PRN Meds:.•  acetaminophen **OR** acetaminophen  •  senna-docusate sodium **AND** polyethylene glycol **AND** bisacodyl **AND** bisacodyl  •  influenza vaccine  •  ondansetron  •  sodium chloride  •  sodium chloride  •  sodium chloride    Assessment & Plan   Assessment & Plan     Active Hospital Problems    Diagnosis  POA   • **UTI (urinary tract infection), bacterial [N39.0, A49.9]  Yes   • Hypertension [I10]  Yes   • Decreased ambulation status [Z74.09]  Yes      Resolved Hospital Problems   No resolved problems to display.        Brief Hospital Course to date:  Corie Kessler is a 89 y.o. female w/ HTN, neuropathy, who lives independently at home w/ daily checks by family who acutely had weakness in her legs while trying to use the commode, unable to get up overnight and pressed life alert; complaining of dysuria w/ abnormal UA    This patient's problems and plans were partially entered by my partner and updated as appropriate by me 01/02/23.      Assessment/Plan    Acute E.coli UTI  Generalized weakness  -UrCx w/ 50,000 CFU E. Coli, she is symptomatic from the same  -S/p x6 doses IV ceftriaxone  -Case management follows for rehab- precert started today  -Melatonin for insomnia    HTN  -cont metoprolol tartrate 100 mg bid, lisinopril 40 mg daily, spironolactone 25 mg daily  - Patient had previously reported drinking a glass of bourbon every night before bed, spikes in BP may be related to partial withdrawal, additionally may be from stress/anxiety, given low-dose oral Valium 1/1. BP shows some improvement overnight and today, will hold further changes and monitor. Due to age can tolerate more liberal BP    Chronic neuropathy  - home gabapentin    Expected Discharge Location and Transportation: Short-term rehab  Expected Discharge 1/3  Expected Discharge Date and Time     Expected Discharge Date Expected Discharge Time    Jan 4, 2023            DVT prophylaxis:  Medical DVT  prophylaxis orders are present.     AM-PAC 6 Clicks Score (PT): 12 (01/02/23 1031)    CODE STATUS:   Code Status and Medical Interventions:   Ordered at: 12/28/22 1450     Medical Intervention Limits:    NO intubation (DNI)     Code Status (Patient has no pulse and is not breathing):    No CPR (Do Not Attempt to Resuscitate)     Medical Interventions (Patient has pulse or is breathing):    Limited Support     Comments:    Per H&P patient is DNR     Release to patient:    Routine Release       Anyi Wills, APRN  01/02/23

## 2023-01-02 NOTE — THERAPY TREATMENT NOTE
Patient Name: Corei Kessler  : 1922    MRN: [id number]                              Today's Date: 2023       Admit Date: 2022    Visit Dx:     ICD-10-CM ICD-9-CM   1. Decreased ambulation status  Z74.09 780.99   2. Non-traumatic rhabdomyolysis  M62.82 728.88   3. Hypercalcemia  E83.52 275.42   4. Dehydration  E86.0 276.51   5. LFT elevation  R79.89 790.6   6. Weakness  R53.1 780.79   7. Abnormal urine finding  R82.90 791.9   8. Elevated TSH  R79.89 794.5   9. Pressure injury of buttock, stage 2, unspecified laterality (HCC)  L89.302 707.05     707.22   10. Elevated glucose  R73.09 790.29     Patient Active Problem List   Diagnosis   • Decreased ambulation status   • Hypertension   • UTI (urinary tract infection), bacterial     Past Medical History:   Diagnosis Date   • Hypertension      History reviewed. No pertinent surgical history.   General Information     Row Name 23 1023          Physical Therapy Time and Intention    Document Type therapy note (daily note)  -AS     Mode of Treatment physical therapy  -AS     Row Name 23 1023          General Information    Patient Profile Reviewed yes  -AS     Existing Precautions/Restrictions fall  Kyphotic posture  -AS     Barriers to Rehab medically complex;hearing deficit  -AS     Row Name 23 1023          Cognition    Orientation Status (Cognition) oriented x 3  -AS     Row Name 23 1023          Safety Issues, Functional Mobility    Safety Issues Affecting Function (Mobility) safety precaution awareness;safety precautions follow-through/compliance;positioning of assistive device;sequencing abilities;awareness of need for assistance  -AS     Impairments Affecting Function (Mobility) balance;endurance/activity tolerance;strength;postural/trunk control;range of motion (ROM)  -AS     Comment, Safety Issues/Impairments (Mobility) alert, needs frequent cues for hand placement when standing and sitting in recliner  -AS           User Key   (r) = Recorded By, (t) = Taken By, (c) = Cosigned By    Initials Name Provider Type    AS Teresa Niño PTA Physical Therapist Assistant               Mobility     Row Name 01/02/23 1024          Bed Mobility    Comment, (Bed Mobility) not tested patient sitting UIC pre/post tx  -AS     Row Name 01/02/23 1024          Sit-Stand Transfer    Sit-Stand [place] (Transfers) verbal cues;moderate assist (50% patient effort);1 person assist  -AS     Assistive Device (Sit-Stand Transfers) walker, front-wheeled  -AS     Comment, (Sit-Stand Transfer) verbal cues for hand placement, kyphotic posture  -AS     Row Name 01/02/23 1024          Gait/Stairs (Locomotion)    [place] Level (Gait) verbal cues;minimum assist (75% patient effort);1 person assist;1 person to manage equipment  -AS     Assistive Device (Gait) walker, front-wheeled  -AS     Distance in Feet (Gait) 60  -AS     Deviations/Abnormal Patterns (Gait) bilateral deviations;base of support, narrow;[place] decreased;gait speed decreased  -AS     Bilateral Gait Deviations forward flexed posture;heel strike decreased  -AS     Comment, (Gait/Stairs) patient ambulated 60' with Min assist x1 and rolling walker for support, tech followed with chair for safety. Ambulates at slow steady gait with increased forward flexed posture. Patient with improved gait mechanics with no shuffling this date. Distance limited by weakness.  -AS           User Key  (r) = Recorded By, (t) = Taken By, (c) = Cosigned By    Initials Name Provider Type    AS Teresa Niño PTA Physical Therapist Assistant               Obj/Interventions     Row Name 01/02/23 1029          Motor Skills    Therapeutic Exercise knee;ankle;shoulder  -AS     Row Name 01/02/23 1029          Shoulder (Therapeutic Exercise)    Shoulder (Therapeutic Exercise) AROM (active range of motion)  -AS     Shoulder AROM (Therapeutic Exercise) bilateral;flexion;extension;aBduction;aDduction;sitting;10  repetitions  bicep curls  -AS     Row Name 01/02/23 1029          Knee (Therapeutic Exercise)    Knee (Therapeutic Exercise) strengthening exercise  -AS     Knee Strengthening (Therapeutic Exercise) bilateral;marching while seated;LAQ (long arc quad);sitting;10 repetitions  -AS     Row Name 01/02/23 1029          Ankle (Therapeutic Exercise)    Ankle (Therapeutic Exercise) AROM (active range of motion)  -AS     Ankle AROM (Therapeutic Exercise) bilateral;dorsiflexion;plantarflexion;sitting;10 repetitions  -AS     Row Name 01/02/23 1029          Balance    Dynamic Standing Balance verbal cues;minimal assist;1-person assist;1 person to manage equipment  -AS     Position/Device Used, Standing Balance supported;walker, front-wheeled  -AS           User Key  (r) = Recorded By, (t) = Taken By, (c) = Cosigned By    Initials Name Provider Type    AS Teresa Niño PTA Physical Therapist Assistant               Goals/Plan    No documentation.                Clinical Impression     Row Name 01/02/23 1030          Pain    Pretreatment Pain Rating 0/10 - no pain  -AS     Posttreatment Pain Rating 0/10 - no pain  -AS     Row Name 01/02/23 1030          Plan of Care Review    Plan of Care Reviewed With patient  -AS     Progress improving  -AS     Outcome Evaluation patient ambulated 60' with Min assist x1 and rolling walker for support, tech followed with chair for safety. Ambulates at slow steady gait with increased forward flexed posture. Patient with improved gait mechanics with no shuffling this date. Distance limited by weakness. Continue to recommend IRF at D/C.  -AS     Row Name 01/02/23 1030          Positioning and Restraints    Pre-Treatment Position sitting in chair/recliner  -AS     Post Treatment Position chair  -AS     In Chair reclined;call light within reach;encouraged to call for assist;exit alarm on;waffle cushion;legs elevated;notified nsg  -AS           User Key  (r) = Recorded By, (t) = Taken By, (c) =  Cosigned By    Initials Name Provider Type    AS Teresa Niño PTA Physical Therapist Assistant               Outcome Measures     Row Name 01/02/23 1031          How much help from another person do you currently need...    Turning from your back to your side while in flat bed without using bedrails? 3  -AS     Moving from lying on back to sitting on the side of a flat bed without bedrails? 2  -AS     Moving to and from a bed to a chair (including a wheelchair)? 2  -AS     Standing up from a chair using your arms (e.g., wheelchair, bedside chair)? 2  -AS     Climbing 3-5 steps with a railing? 1  -AS     To walk in hospital room? 2  -AS     AM-PAC 6 Clicks Score (PT) 12  -AS     Highest level of mobility 4 --> Transferred to chair/commode  -AS     Row Name 01/02/23 1031          Functional Assessment    Outcome Measure Options AM-PAC 6 Clicks Basic Mobility (PT)  -AS           User Key  (r) = Recorded By, (t) = Taken By, (c) = Cosigned By    Initials Name Provider Type    AS Teresa Niño PTA Physical Therapist Assistant                             Physical Therapy Education     Title: PT OT SLP Therapies (In Progress)     Topic: Physical Therapy (In Progress)     Point: Mobility training (In Progress)     Learning Progress Summary           Patient Acceptance, E, NR by AS at 1/2/2023 1031    Acceptance, E,TB, VU by  at 1/1/2023 1522    Comment: Reviewed safety with mobility    Eager, E, VU,NR by  at 12/31/2022 1520    Acceptance, E, VU,NR by RONI at 12/28/2022 1608   Family Eager, E, VU,NR by  at 12/31/2022 1520                   Point: Home exercise program (In Progress)     Learning Progress Summary           Patient Acceptance, E, NR by AS at 1/2/2023 1031    Acceptance, E,TB, VU by  at 1/1/2023 1522    Comment: Reviewed safety with mobility                   Point: Precautions (In Progress)     Learning Progress Summary           Patient Acceptance, E, NR by AS at 1/2/2023 1031     Acceptance, E,TB, VU by  at 1/1/2023 1522    Comment: Reviewed safety with mobility    Eager, E, VU,NR by  at 12/31/2022 1520    Acceptance, E, VU,NR by  at 12/28/2022 1608   Family Eager, E, VU,NR by  at 12/31/2022 1520                               User Key     Initials Effective Dates Name Provider Type Discipline     06/16/21 -  Tessa Jackson, PT Physical Therapist PT    AS 06/16/21 -  Teresa Niño PTA Physical Therapist Assistant PT     06/16/21 -  Nicky Bennett, MIO Physical Therapist PT     09/22/20 -  Duran Grant, PT Physical Therapist PT              PT Recommendation and Plan     Plan of Care Reviewed With: patient  Progress: improving  Outcome Evaluation: patient ambulated 60' with Min assist x1 and rolling walker for support, tech followed with chair for safety. Ambulates at slow steady gait with increased forward flexed posture. Patient with improved gait mechanics with no shuffling this date. Distance limited by weakness. Continue to recommend IRF at D/C.     Time Calculation:    PT Charges     Row Name 01/02/23 1032             Time Calculation    Start Time 0955  -AS      PT Received On 01/02/23  -AS      PT Goal Re-Cert Due Date 01/07/23  -AS         Timed Charges    35632 - PT Therapeutic Exercise Minutes 10  -AS      99706 - Gait Training Minutes  15  -AS         Total Minutes    Timed Charges Total Minutes 25  -AS       Total Minutes 25  -AS            User Key  (r) = Recorded By, (t) = Taken By, (c) = Cosigned By    Initials Name Provider Type    AS Teresa Niño PTA Physical Therapist Assistant              Therapy Charges for Today     Code Description Service Date Service Provider Modifiers Qty    95123118744 HC PT THER PROC EA 15 MIN 1/2/2023 Teresa Niño PTA GP 1    18548542096 HC GAIT TRAINING EA 15 MIN 1/2/2023 Teresa Niño, DEN GP 1    63673531316 HC PT THER SUPP EA 15 MIN 1/2/2023 Teresa Niño, DEN GP 2          PT G-Codes  Outcome  Measure Options: AM-PAC 6 Clicks Basic Mobility (PT)  AM-PAC 6 Clicks Score (PT): 12  AM-PAC 6 Clicks Score (OT): 12       Teresa Niño, DEN  1/2/2023

## 2023-01-02 NOTE — CASE MANAGEMENT/SOCIAL WORK
Continued Stay Note  [facility]     Patient Name: Corie Kessler  MRN: [id number]  Today's Date: 1/2/2023    Admit Date: 12/27/2022    Plan: [facility]   Discharge Plan     Row Name 01/02/23 0925       Plan    Plan [facility]    Plan Comments I left message with Nim[facility] from [facility] to go ahead and start precert for skilled. They have begun the precert for [facility]. She will need wheelchair transport such as [facility] or [facility].    Final Discharge Disposition Code 03 - skilled nursing facility (SNF)               Discharge Codes    No documentation.               Expected Discharge Date and Time     Expected Discharge Date Expected Discharge Time    Jan 4, 2023             Nimisha Márquez RN     acting out behavior for months

## 2023-01-02 NOTE — PLAN OF CARE
Goal Outcome Evaluation:  Plan of Care Reviewed With: patient        Progress: improving  Outcome Evaluation: patient ambulated 60' with Min assist x1 and rolling walker for support, tech followed with chair for safety. Ambulates at slow steady gait with increased forward flexed posture. Patient with improved gait mechanics with no shuffling this date. Distance limited by weakness. Continue to recommend IRF at D/C.

## 2023-01-02 NOTE — CASE MANAGEMENT/SOCIAL WORK
Case Management Discharge Note      Final Note: For Tues: [facility] approved by insurance. Nursing to call report to [phone]. She will need covid test. She will need to be in the [facility]/maternity entrance [facility]         Selected Continued Care - Admitted Since 12/27/2022     Destination Coordination complete.    Service Provider Selected Services Address Phone Fax Patient Preferred    [facility] [place] [id number] [phone] --          Durable Medical Equipment    No services have been selected for the patient.              Dialysis/Infusion    No services have been selected for the patient.              Home Medical Care    No services have been selected for the patient.              Therapy    No services have been selected for the patient.              Community Resources    No services have been selected for the patient.              Community & DME    No services have been selected for the patient.                       Final Discharge Disposition Code: 03 - skilled nursing facility (SNF)

## 2023-01-02 NOTE — PLAN OF CARE
Goal Outcome Evaluation:   Vital signs stable. BP intermittently elevated, DO notified x2. Normal sinus rhythm. Room air. Patient alert and oriented x4. Reports of gluteal wound pain x2, relieved with tylenol. Skin care provided, turned q2. No apparent further needs at this time.

## 2023-01-03 VITALS
TEMPERATURE: 98.1 F | OXYGEN SATURATION: 94 % | HEIGHT: 62 IN | HEART RATE: 78 BPM | BODY MASS INDEX: 21.02 KG/M2 | WEIGHT: 114.2 LBS | DIASTOLIC BLOOD PRESSURE: 72 MMHG | RESPIRATION RATE: 18 BRPM | SYSTOLIC BLOOD PRESSURE: 144 MMHG

## 2023-01-03 PROCEDURE — 99232 SBSQ HOSP IP/OBS MODERATE 35: CPT | Performed by: INTERNAL MEDICINE

## 2023-01-03 PROCEDURE — 97116 GAIT TRAINING THERAPY: CPT

## 2023-01-03 PROCEDURE — 97110 THERAPEUTIC EXERCISES: CPT

## 2023-01-03 PROCEDURE — G0378 HOSPITAL OBSERVATION PER HR: HCPCS

## 2023-01-03 PROCEDURE — 97535 SELF CARE MNGMENT TRAINING: CPT

## 2023-01-03 PROCEDURE — 97530 THERAPEUTIC ACTIVITIES: CPT

## 2023-01-03 RX ORDER — AMOXICILLIN 250 MG
2 CAPSULE ORAL 2 TIMES DAILY
Start: 2023-01-03

## 2023-01-03 RX ORDER — SPIRONOLACTONE 25 MG/1
25 TABLET ORAL
Start: 2023-01-03

## 2023-01-03 RX ORDER — CASTOR OIL AND BALSAM, PERU 788; 87 MG/G; MG/G
1 OINTMENT TOPICAL EVERY 12 HOURS SCHEDULED
Start: 2023-01-03

## 2023-01-03 RX ORDER — POLYETHYLENE GLYCOL 3350 17 G/17G
17 POWDER, FOR SOLUTION ORAL DAILY PRN
Start: 2023-01-03

## 2023-01-03 RX ORDER — ACETAMINOPHEN 325 MG/1
650 TABLET ORAL EVERY 4 HOURS PRN
Start: 2023-01-03

## 2023-01-03 RX ADMIN — SPIRONOLACTONE 25 MG: 25 TABLET ORAL at 08:28

## 2023-01-03 RX ADMIN — CASTOR OIL AND BALSAM, PERU 1 APPLICATION: 788; 87 OINTMENT TOPICAL at 08:30

## 2023-01-03 RX ADMIN — FLUPHENAZINE HYDROCHLORIDE 2.5 MG: 5 TABLET, FILM COATED ORAL at 08:28

## 2023-01-03 RX ADMIN — Medication 10 ML: at 08:29

## 2023-01-03 RX ADMIN — GABAPENTIN 300 MG: 300 CAPSULE ORAL at 08:28

## 2023-01-03 RX ADMIN — LISINOPRIL 40 MG: 20 TABLET ORAL at 08:28

## 2023-01-03 RX ADMIN — TIMOLOL MALEATE: 5 SOLUTION OPHTHALMIC at 08:28

## 2023-01-03 RX ADMIN — METOPROLOL TARTRATE 100 MG: 100 TABLET, FILM COATED ORAL at 08:28

## 2023-01-03 RX ADMIN — ACETAMINOPHEN 650 MG: 325 TABLET ORAL at 11:46

## 2023-01-03 NOTE — PLAN OF CARE
Goal Outcome Evaluation:  Plan of Care Reviewed With: patient        Progress: improving  Outcome Evaluation: Pt very motivated to participate in session this date. SUA for grooming tasks. Mani for bed mobility rolling to the L to complete toileting hygiene. ModA x 2 for supine to sit w/ v/c for sequencing and hand placement. Reports of dizziness w/ positional changes, BP stable. Mani for UB dressing and MaxA for LB dressing. Pt requiring occasional v/c for hand placement and safety. Continue per current POC as able.

## 2023-01-03 NOTE — THERAPY TREATMENT NOTE
Patient Name: Corie Kessler  : 1922    MRN: [id number]                              Today's Date: 1/3/2023       Admit Date: 2022    Visit Dx:     ICD-10-CM ICD-9-CM   1. Decreased ambulation status  Z74.09 780.99   2. Non-traumatic rhabdomyolysis  M62.82 728.88   3. Hypercalcemia  E83.52 275.42   4. Dehydration  E86.0 276.51   5. LFT elevation  R79.89 790.6   6. Weakness  R53.1 780.79   7. Abnormal urine finding  R82.90 791.9   8. Elevated TSH  R79.89 794.5   9. Pressure injury of buttock, stage 2, unspecified laterality (HCC)  L89.302 707.05     707.22   10. Elevated glucose  R73.09 790.29     Patient Active Problem List   Diagnosis   • Decreased ambulation status   • Hypertension   • UTI (urinary tract infection), bacterial     Past Medical History:   Diagnosis Date   • Hypertension      History reviewed. No pertinent surgical history.   General Information     Row Name 23 0943          OT Time and Intention    Document Type therapy note (daily note)  -MR     Mode of Treatment occupational therapy  -MR     Row Name 23 0943          General Information    Patient Profile Reviewed yes  -MR     Existing Precautions/Restrictions fall  Kyphotic posture; R foot drop  -MR     Barriers to Rehab medically complex;hearing deficit  -MR     Row Name 23 0943          Living Environment    People in Home --  Dtr lives close by and can assist as needed  -MR     Row Name 23 0943          Cognition    Orientation Status (Cognition) oriented x 3  -MR     Row Name 23 0943          Safety Issues, Functional Mobility    Safety Issues Affecting Function (Mobility) safety precautions follow-through/compliance;safety precaution awareness;positioning of assistive device;sequencing abilities;awareness of need for assistance  -MR     Impairments Affecting Function (Mobility) balance;endurance/activity tolerance;strength;postural/trunk control;range of motion (ROM)  -MR           User Key  (r) =  Recorded By, (t) = Taken By, (c) = Cosigned By    Initials Name Provider Type    MR Nicolasa Box, OT Occupational Therapist                 Mobility/ADL's     Row Name 01/03/23 0946          Bed Mobility    Bed Mobility supine-sit;rolling left  -MR     Rolling Left Chambers (Bed Mobility) minimum assist (75% patient effort);verbal cues  -MR     Supine-Sit Chambers (Bed Mobility) moderate assist (50% patient effort);2 person assist;verbal cues;nonverbal cues (demo/gesture)  -MR     Assistive Device (Bed Mobility) draw sheet;head of bed elevated;bed rails  -MR     Row Name 01/03/23 0946          Transfers    Transfers sit-stand transfer  -MR     Row Name 01/03/23 0946          Bed-Chair Transfer    Bed-Chair Chambers (Transfers) verbal cues;contact guard  -MR     Assistive Device (Bed-Chair Transfers) walker, front-wheeled  -MR     Row Name 01/03/23 0946          Activities of Daily Living    BADL Assessment/Intervention upper body dressing;lower body dressing;grooming;toileting  -MR     Row Name 01/03/23 0946          Lower Body Dressing Assessment/Training    Chambers Level (Lower Body Dressing) don;socks;maximum assist (25% patient effort)  -MR     Position (Lower Body Dressing) supine  -MR     Row Name 01/03/23 0946          Grooming Assessment/Training    Chambers Level (Grooming) hair care, combing/brushing;wash face, hands;set up  -MR     Position (Grooming) sitting up in bed;supported sitting  -MR     Row Name 01/03/23 0946          Toileting Assessment/Training    Chambers Level (Toileting) adjust/manage clothing;perform perineal hygiene;dependent (less than 25% patient effort)  -MR     Position (Toileting) supine  -MR     Comment, (Toileting) Episode of incontinence noted while in bed.  -MR     Row Name 01/03/23 0946          Upper Body Dressing Assessment/Training    Chambers Level (Upper Body Dressing) doff;don;other (see comments);minimum assist (75% patient effort)  hospital  gown  -MR     Position (Upper Body Dressing) edge of bed sitting  -MR           User Key  (r) = Recorded By, (t) = Taken By, (c) = Cosigned By    Initials Name Provider Type     Nicolasa Box OT Occupational Therapist               Obj/Interventions     Row Name 01/03/23 0947          Balance    Balance Assessment sitting static balance;sitting dynamic balance;standing static balance;standing dynamic balance  -MR     Static Sitting Balance standby assist  -MR     Dynamic Sitting Balance contact guard  -MR     Position, Sitting Balance unsupported;sitting edge of bed  -MR     Static Standing Balance contact guard;verbal cues  -MR     Dynamic Standing Balance minimal assist;verbal cues  -MR     Position/Device Used, Standing Balance supported;walker, rolling  -MR     Balance Interventions sitting;standing;sit to stand;supported;static;dynamic;minimal challenge;occupation based/functional task  -MR           User Key  (r) = Recorded By, (t) = Taken By, (c) = Cosigned By    Initials Name Provider Type    Nicolasa Aponte, B[facility] Occupational Therapist               Goals/Plan    No documentation.                Clinical Impression     Row Name 01/03/23 0948          Pain Assessment    Pretreatment Pain Rating 0/10 - no pain  -MR     Posttreatment Pain Rating 0/10 - no pain  -MR     Pain Intervention(s) Ambulation/increased activity;Repositioned  -MR     Row Name 01/03/23 0963          Plan of Care Review    Plan of Care Reviewed With patient  -MR     Progress improving  -MR     Outcome Evaluation Pt very motivated to participate in session this date. SUA for grooming tasks. Mani for bed mobility rolling to the L to complete toileting hygiene. ModA x 2 for supine to sit w/ v/c for sequencing and hand placement. Reports of dizziness w/ positional changes, BP stable. Mani for UB dressing and MaxA for LB dressing. Pt requiring occasional v/c for hand placement and safety. Continue per current POC as able.  -MR     Row Name  01/03/23 0948          Therapy Plan Review/Discharge Plan (OT)    Anticipated Discharge Disposition (OT) skilled nursing facility  -MR     Row Name 01/03/23 0948          Vital Signs    Pre Patient Position Supine  -MR     Intra Patient Position Standing  -MR     Post Patient Position Sitting  -MR     Row Name 01/03/23 0948          Positioning and Restraints    Pre-Treatment Position in bed  -MR     Post Treatment Position bed  -MR     In Bed sitting EOB;with PT  -MR           User Key  (r) = Recorded By, (t) = Taken By, (c) = Cosigned By    Initials Name Provider Type    Nicolasa Aponte, OT Occupational Therapist               Outcome Measures     Row Name 01/03/23 0951          How much help from another is currently needed...    Putting on and taking off regular lower body clothing? 1  -MR     Bathing (including washing, rinsing, and drying) 2  -MR     Toileting (which includes using toilet bed pan or urinal) 1  -MR     Putting on and taking off regular upper body clothing 2  -MR     Taking care of personal grooming (such as brushing teeth) 3  -MR     Eating meals 3  -MR     AM-PAC 6 Clicks Score (OT) 12  -MR     Row Name 01/03/23 0914 01/03/23 0830       How much help from another person do you currently need...    Turning from your back to your side while in flat bed without using bedrails? 3  -LR 3  -MF    Moving from lying on back to sitting on the side of a flat bed without bedrails? 2  -LR 2  -MF    Moving to and from a bed to a chair (including a wheelchair)? 2  -LR 2  -MF    Standing up from a chair using your arms (e.g., wheelchair, bedside chair)? 2  -LR 2  -MF    Climbing 3-5 steps with a railing? 1  -LR 1  -MF    To walk in hospital room? 3  -LR 2  -MF    AM-PAC 6 Clicks Score (PT) 13  -LR 12  -MF    Highest level of mobility 4 --> Transferred to chair/commode  -LR 4 --> Transferred to chair/commode  -MF    Row Name 01/03/23 0951 01/03/23 0914       Functional Assessment    Outcome Measure Options  AM-PAC 6 Clicks Daily Activity (OT)  -MR AM-PAC 6 Clicks Basic Mobility (PT)  -LR          User Key  (r) = Recorded By, (t) = Taken By, (c) = Cosigned By    Initials Name Provider Type    Mariela Garcia, PT Physical Therapist    Nicolasa Aponte, OT Occupational Therapist    Bam Panchal, RN Registered Nurse                Occupational Therapy Education     Title: PT OT SLP Therapies (In Progress)     Topic: Occupational Therapy (In Progress)     Point: ADL training (In Progress)     Description:   Instruct learner(s) on proper safety adaptation and remediation techniques during self care or transfers.   Instruct in proper use of assistive devices.              Learning Progress Summary           Patient Acceptance, E, VU by MR at 1/3/2023 0955    Acceptance, E, NR by  at 12/31/2022 1440    Acceptance, E,D, VU,DU by  at 12/28/2022 0947   Family Acceptance, E, NR by  at 12/31/2022 1440                   Point: Home exercise program (Not Started)     Description:   Instruct learner(s) on appropriate technique for monitoring, assisting and/or progressing therapeutic exercises/activities.              Learner Progress:  Not documented in this visit.          Point: Precautions (Done)     Description:   Instruct learner(s) on prescribed precautions during self-care and functional transfers.              Learning Progress Summary           Patient Acceptance, E, VU by MR at 1/3/2023 0955    Acceptance, E,D, VU,DU by  at 12/28/2022 0947                   Point: Body mechanics (Done)     Description:   Instruct learner(s) on proper positioning and spine alignment during self-care, functional mobility activities and/or exercises.              Learning Progress Summary           Patient Acceptance, E, VU by MR at 1/3/2023 0955    Acceptance, E,D, VU,DU by  at 12/28/2022 0947                               User Key     Initials Effective Dates Name Provider Type Aurelio NEWTON 06/16/21 -  Gisele  Lily COMBS OT Occupational Therapist OT     06/16/21 -  Mj Tyler, OT Occupational Therapist OT    MR 09/22/22 -  Nicolasa Box OT Occupational Therapist OT              OT Recommendation and Plan     Plan of Care Review  Plan of Care Reviewed With: patient  Progress: improving  Outcome Evaluation: Pt very motivated to participate in session this date. SUA for grooming tasks. Mani for bed mobility rolling to the L to complete toileting hygiene. ModA x 2 for supine to sit w/ v/c for sequencing and hand placement. Reports of dizziness w/ positional changes, BP stable. Mani for UB dressing and MaxA for LB dressing. Pt requiring occasional v/c for hand placement and safety. Continue per current POC as able.     Time Calculation:    Time Calculation- OT     Row Name 01/03/23 0956             Time Calculation- OT    OT Start Time 0902  -MR      OT Received On 01/03/23  -MR         Timed Charges    88417 - OT Therapeutic Activity Minutes 8  -MR      39239 - OT Self Care/Mgmt Minutes 15  -MR         Total Minutes    Timed Charges Total Minutes 23  -MR       Total Minutes 23  -MR            User Key  (r) = Recorded By, (t) = Taken By, (c) = Cosigned By    Initials Name Provider Type    MR Nicolasa Box OT Occupational Therapist              Therapy Charges for Today     Code Description Service Date Service Provider Modifiers Qty    96076166216  OT THERAPEUTIC ACT EA 15 MIN 1/3/2023 Nicolasa Box OT GO 1    75190504869 HC OT SELF CARE/MGMT/TRAIN EA 15 MIN 1/3/2023 Nicolasa Box OT GO 1               Nicolasa Box OT  1/3/2023

## 2023-01-03 NOTE — PLAN OF CARE
Discharge education completed at bedside with patient.  IV removed.  Rachelle RN at the [facility], was given report.

## 2023-01-03 NOTE — DISCHARGE SUMMARY
[facility] Medicine Services  DISCHARGE SUMMARY    Patient Name: Corie Kessler  : 1922  MRN: [id number]    Date of Admission: 2022 10:19 AM  Date of Discharge:  1/3/2023  Primary Care Physician: Sweta Nagel MD    Consults     No orders found from 2022 to 2022.          Hospital Course     Presenting Problem:   Decreased ambulation status [Z74.09]    Active Hospital Problems    Diagnosis  POA   • **UTI (urinary tract infection), bacterial [N39.0, A49.9]  Yes   • Hypertension [I10]  Yes   • Decreased ambulation status [Z74.09]  Yes      Resolved Hospital Problems   No resolved problems to display.          Hospital Course:  Corie Kessler is a 89 y.o. female with past medical history of essential hypertension, not on medications at home, who lives alone and is usually independent with her ADLs, using a walker to ambulate and lives close to her daughter.  The evening before admission, she went to the bathroom but was too weak to rise from the commode.  She spent the night on it - although she had a Lifeline button - because she did not want to bother her daughter until morning.    Acute E.coli UTI  Generalized weakness  -UrCx w/ 50,000 CFU E. Coli, she is symptomatic from the same  -S/p x6 doses IV ceftriaxone  - worked with PT  - going to inpatient rehab     HTN  - cont metoprolol tartrate 100 mg bid, lisinopril 40 mg daily, spironolactone 25 mg daily  - Patient had previously reported drinking a glass of bourbon every night before bed, spikes in BP may be related to partial withdrawal,   - Due to age can tolerate more liberal BP    Electrolytes:  She is on lisinopril 40 mg BID and spironolactone every other day.  Potassium was 3.6 on admission, 4.2 on .  Plan to recheck once more            Discharge Follow Up Recommendations for outpatient labs/diagnostics:   *follow up with PCP after discharge from rehab    *Check BMP in 2 days     Day of Discharge      HPI:   Currently up in recliner but notes that she has been  Up for an hour and would prefer to get back into bed.  Is looking forward to leaving for rehab today.     Review of Systems  Gen- No fevers, chills  CV- No chest pain, palpitations  Resp- No cough, dyspnea  GI- No N/V/D, abd pain        Vital Signs:   Temp:  [98.1 °F (36.7 °C)-98.4 °F (36.9 °C)] 98.3 °F (36.8 °C)  Heart Rate:  [61-80] 79  Resp:  [14-18] 16  BP: (108-168)/(54-74) 144/72      Physical Exam:  Constitutional: Awake, alert woman with fairly good hearing, appropriate and conversant.  Thin.  Sitting up in recliner  Eyes: PER sclerae anicteric, no conjunctival injection  HENT: NCAT, mucous membranes moist  Neck: Supple,  trachea midline  Respiratory: Clear to auscultation bilaterally, nonlabored respirations   Cardiovascular: RRR, no murmurs,  Gastrointestinal: Positive bowel sounds, soft, nontender, nondistended  Musculoskeletal: No bilateral ankle edema, no clubbing or cyanosis to extremities  Psychiatric: Appropriate affect, cooperative  Neurologic: Oriented x 3,mof=ves all extremities, Cranial Nerves grossly intact to confrontation, speech clear  Skin: thin skin.  Warm and dry       Pertinent  and/or Most Recent Results     LAB RESULTS:      Lab 12/31/22  0703 12/28/22  0654   WBC 4.62 5.60   HEMOGLOBIN 11.3* 11.8*   HEMATOCRIT 34.5 35.5   PLATELETS 198 188   NEUTROS ABS  --  3.82   IMMATURE GRANS (ABS)  --  0.03   LYMPHS ABS  --  0.96   MONOS ABS  --  0.54   EOS ABS  --  0.20   MCV 94.5 93.7         Lab 12/31/22  1404 12/28/22  0654   SODIUM 139 139   POTASSIUM 4.2 3.7   CHLORIDE 105 110*   CO2 25.0 18.0*   ANION GAP 9.0 11.0   BUN 11 19   CREATININE 0.48* 0.71   EGFR 84.7 76.0   GLUCOSE 141* 99   CALCIUM 9.2 8.8   MAGNESIUM  --  2.2   PHOSPHORUS  --  3.6                         Brief Urine Lab Results  (Last result in the past 365 days)      Color   Clarity   Blood   Leuk Est   Nitrite   Protein   CREAT   Urine HCG        12/27/22 1103  Dark Yellow   Turbid   Negative   Small (1+)   Negative   100 mg/dL (2+)               Microbiology Results (last 10 days)     Procedure Component Value - Date/Time    COVID PRE-OP / PRE-PROCEDURE SCREENING ORDER (NO ISOLATION) - Swab, Nasopharynx [[id number]]  (Normal) Collected: 01/02/23 1718    Lab Status: Final result Specimen: Swab from Nasopharynx Updated: 01/02/23 1748    Narrative:      The following orders were created for panel order COVID PRE-OP / PRE-PROCEDURE SCREENING ORDER (NO ISOLATION) - Swab, Nasopharynx.  Procedure                               Abnormality         Status                     ---------                               -----------         ------                     COVID-19 and FLU A/B PCR...[[id number]]  Normal              Final result                 Please view results for these tests on the individual orders.    COVID-19 and FLU A/B PCR - Swab, Nasopharynx [[id number]]  (Normal) Collected: 01/02/23 1718    Lab Status: Final result Specimen: Swab from Nasopharynx Updated: 01/02/23 1748     COVID19 Not Detected     Influenza A PCR Not Detected     Influenza B PCR Not Detected    Narrative:      Fact sheet for providers: https://www.fda.gov/media/696198/download    Fact sheet for patients: https://www.fda.gov/media/345287/download    Test performed by PCR.    Blood Culture - Blood, Arm, Right [[id number]]  (Normal) Collected: 12/27/22 1542    Lab Status: Final result Specimen: Blood from Arm, Right Updated: 01/01/23 1646     Blood Culture No growth at 5 days    Blood Culture - Blood, Hand, Right [[id number]]  (Normal) Collected: 12/27/22 1542    Lab Status: Final result Specimen: Blood from Hand, Right Updated: 01/01/23 1646     Blood Culture No growth at 5 days    COVID-19 and FLU A/B PCR - Swab, Nasopharynx [[id number]]  (Normal) Collected: 12/27/22 1107    Lab Status: Final result Specimen: Swab from Nasopharynx Updated: 12/27/22 1207     COVID19 Not Detected     Influenza A PCR Not  Detected     Influenza B PCR Not Detected    Narrative:      Fact sheet for providers: https://www.fda.gov/media/508438/download    Fact sheet for patients: https://www.fda.gov/media/089948/download    Test performed by PCR.    Urine Culture - Urine, Urine, Clean Catch [[id number]]  (Abnormal)  (Susceptibility) Collected: 12/27/22 1103    Lab Status: Final result Specimen: Urine, Clean Catch Updated: 12/29/22 1217     Urine Culture 50,000 CFU/mL Escherichia coli    Narrative:      Colonization of the urinary tract without infection is common. Treatment is discouraged unless the patient is symptomatic, pregnant, or undergoing an invasive urologic procedure.    Susceptibility      Escherichia coli      JOANNE      Ampicillin Susceptible      Ampicillin + Sulbactam Susceptible      Cefazolin Susceptible      Cefepime Susceptible      Ceftazidime Susceptible      Ceftriaxone Susceptible      Gentamicin Susceptible      Levofloxacin Susceptible      Nitrofurantoin Susceptible      Piperacillin + Tazobactam Susceptible      Trimethoprim + Sulfamethoxazole Susceptible                                 CT Head Without Contrast    Result Date: 12/28/2022  CT HEAD WO CONTRAST-  Date of Exam: 12/28/2022 10:06 AM  Indication: Weakness and fall; Z74.09-Other reduced mobility; M62.82-Rhabdomyolysis; E83.52-Hypercalcemia; E86.0-Dehydration; R79.89-Other specified abnormal findings of blood chemistry; R53.1-Weakness; R82.90-Unspecified abnormal findings in urine; R79.89-Other specified abnormal findings of blood chemistry; L89.302-Pressure ulcer of unspecified buttock, stage 2; R73.09-Other abnormal glucose.  Comparison: 3/28/2010  Technique:  Contiguous axial CT images of the head were obtained without contrast from skull base to vertex.  Coronal and sagittal reconstructions were performed.  Automated exposure control and iterative reconstruction methods were used.   FINDINGS: Brain/Ventricles: There is moderate diffuse atrophy which is  slightly progressed in the comparison. No suspicious extra-axial fluid collections are noted. No acute intracranial hemorrhage or mass effect noted. Patchy confluent areas of low-attenuation within the supratentorial white matter noted compatible with small vessel ischemic changes for this age group.  Orbits: The visualized portion of the orbits demonstrate no acute abnormality.  Sinuses:Chronic left mastoid effusion noted. The right mastoid air cells are grossly clear. Partial opacification of the paranasal sinuses including the left frontal, ethmoid, maxillary and sphenoid air cells noted.  Soft Tissues/Skull: No acute abnormality of the visualized skull or soft tissues.       1. Chronic atrophy and white matter changes compatible small vessel ischemic disease 2. No acute intracranial hemorrhage or mass effect 3. Acute paranasal sinusitis 4. Left mastoid effusion which appears to be chronic. Please correlate clinically  This report was finalized on 12/28/2022 11:15 AM by Jarvis Prieto.      XR Chest 1 View    Result Date: 12/27/2022  DATE OF EXAM: 12/27/2022 11:09 AM  PROCEDURE: XR CHEST 1 VW-  INDICATIONS: Weak/Dizzy/AMS triage protocol  COMPARISON: 3/30/2010 portable chest radiograph  TECHNIQUE: Single radiographic AP view of the chest was obtained.  FINDINGS: Heart is normal in size. Vasculature appears normal. Mild coarsening of pulmonary interstitial markings may be chronic senescent change, given patient's age. There may be a small focus of atelectasis in the right lung base but no other potential focal lung disease is seen. No effusion or pneumothorax is seen. 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 No acute bony abnormalities are appreciated.      Suspected small focus of left basilar atelectasis, seen superimposed on the left heart shadow. Mild nonspecific pulmonary interstitial changes elsewhere.  This report was finalized on 12/27/2022 11:52 AM by Dr. Jeff Jackson MD.      Discharge Details         Discharge Medications      New Medications      Instructions Start Date   acetaminophen 325 MG tablet  Commonly known as: TYLENOL   650 mg, Oral, Every 4 Hours PRN      castor oil-balsam peru ointment   1 application, Topical, Every 12 Hours Scheduled      polyethylene glycol 17 g packet  Commonly known as: MIRALAX   17 g, Oral, Daily PRN      sennosides-docusate 8.6-50 MG per tablet  Commonly known as: PERICOLACE   2 tablets, Oral, 2 Times Daily         Changes to Medications      Instructions Start Date   spironolactone 25 MG tablet  Commonly known as: ALDACTONE  What changed: when to take this   25 mg, Oral, Every 48 Hours         Continue These Medications      Instructions Start Date   dorzolamide-timolol 22.3-6.8 MG/ML ophthalmic solution  Commonly known as: COSOPT   1 drop, Both Eyes, 2 Times Daily      fluPHENAZine 5 MG tablet  Commonly known as: PROLIXIN   2.5 mg, Oral, Daily      gabapentin 300 MG capsule  Commonly known as: NEURONTIN   300 mg, Oral, 3 Times Daily      lisinopril 40 MG tablet  Commonly known as: PRINIVIL,ZESTRIL   40 mg, Oral, 2 Times Daily      metoprolol tartrate 100 MG tablet  Commonly known as: LOPRESSOR   100 mg, Oral, 2 Times Daily      propylthiouracil 50 MG tablet  Commonly known as: PTU   50 mg, Oral, Daily             No Known Allergies      Discharge Disposition:   To inpatient rehab   Skilled Nursing Facility (DC - External)    Diet:  Hospital:  Diet Order   Procedures   • Diet: Regular/House Diet; Texture: Soft to Chew (NDD 3); Soft to Chew: Ground Meat; Fluid Consistency: Thin (IDDSI 0)       Activity:  Therapy as tolerated        CODE STATUS:    Code Status and Medical Interventions:   Ordered at: 12/28/22 1451     Medical Intervention Limits:    NO intubation (DNI)     Code Status (Patient has no pulse and is not breathing):    No CPR (Do Not Attempt to Resuscitate)     Medical Interventions (Patient has pulse or is breathing):    Limited Support     Comments:    Per H&P  patient is DNR     Release to patient:    Routine Release       Shea Yost MD  01/03/23      Time Spent on Discharge:  I spent  40  minutes on this discharge activity which included: face-to-face encounter with the patient, reviewing the data in the system, coordination of the care with the nursing staff as well as consultants, documentation, and entering orders.

## 2023-01-03 NOTE — THERAPY TREATMENT NOTE
Patient Name: Corie Kessler  : 1922    MRN: [id number]                              Today's Date: 1/3/2023       Admit Date: 2022    Visit Dx:     ICD-10-CM ICD-9-CM   1. Decreased ambulation status  Z74.09 780.99   2. Non-traumatic rhabdomyolysis  M62.82 728.88   3. Hypercalcemia  E83.52 275.42   4. Dehydration  E86.0 276.51   5. LFT elevation  R79.89 790.6   6. Weakness  R53.1 780.79   7. Abnormal urine finding  R82.90 791.9   8. Elevated TSH  R79.89 794.5   9. Pressure injury of buttock, stage 2, unspecified laterality (HCC)  L89.302 707.05     707.22   10. Elevated glucose  R73.09 790.29     Patient Active Problem List   Diagnosis   • Decreased ambulation status   • Hypertension   • UTI (urinary tract infection), bacterial     Past Medical History:   Diagnosis Date   • Hypertension      History reviewed. No pertinent surgical history.   General Information     Row Name 23          Physical Therapy Time and Intention    Document Type therapy note (daily note)  -LR     Mode of Treatment physical therapy  -LR     Row Name 23          General Information    Patient Profile Reviewed yes  -LR     Existing Precautions/Restrictions fall;other (see comments)  kyphotic posture, R foot drop  -LR     Barriers to Rehab medically complex;hearing deficit  -LR     Row Name 23          Cognition    Orientation Status (Cognition) oriented x 3  -LR     Row Name 23          Safety Issues, Functional Mobility    Safety Issues Affecting Function (Mobility) safety precautions follow-through/compliance;safety precaution awareness;positioning of assistive device;insight into deficits/self-awareness;awareness of need for assistance  -LR     Impairments Affecting Function (Mobility) strength;balance;postural/trunk control;endurance/activity tolerance;motor control;range of motion (ROM)  -LR           User Key  (r) = Recorded By, (t) = Taken By, (c) = Cosigned By    Initials Name  Provider Type    LR Mariela Zamudio, PT Physical Therapist               Mobility     Row Name 01/03/23 0914          Bed Mobility    Bed Mobility supine-sit  -LR     Supine-Sit [place] (Bed Mobility) verbal cues;moderate assist (50% patient effort);2 person assist  -LR     Sit-Supine [place] (Bed Mobility) not tested  -LR     Assistive Device (Bed Mobility) head of bed elevated;draw sheet;bed rails  -LR     Comment, (Bed Mobility) Verbal cues to bring LEs off EOB and to push up from bed to raise trunk into sitting. Mod assist required at trunk and to bring LEs off EOB and to scoot hips out to get feet on floor. Patient c/o mild dizziness upon sitting up. Improved with rest. BP elevated with sitting up.  -LR     Row Name 01/03/23 0914          Transfers    Comment, (Transfers) Verbal cues for correct hand placement with t/f. Performed x2. Patient pulled on RW to stand despite these cues but did reach back for chair when lowering into sitting.  -LR     Row Name 01/03/23 0914          Bed-Chair Transfer    Bed-Chair [place] (Transfers) not tested  -LR     Row Name 01/03/23 0914          Sit-Stand Transfer    Sit-Stand [place] (Transfers) verbal cues;minimum assist (75% patient effort);2 person assist  -LR     Assistive Device (Sit-Stand Transfers) walker, front-wheeled  -LR     Row Name 01/03/23 0914          Gait/Stairs (Locomotion)    [place] Level (Gait) verbal cues;contact guard;1 person to manage equipment  -LR     Assistive Device (Gait) walker, front-wheeled  -LR     Distance in Feet (Gait) 75  -LR     Deviations/Abnormal Patterns (Gait) bilateral deviations;[place] decreased;gait speed decreased;stride length decreased;right sided deviations;steppage  -LR     Bilateral Gait Deviations forward flexed posture;heel strike decreased  -LR     Right Sided Gait Deviations foot drop/toe drag  -LR     [place] Level (Stairs) not tested  -LR     Comment, (Gait/Stairs) Patient  ambulated with step through gait pattern at slow pace with steppage gait on R to compensate for R foot drop. Verbal cues for upright posture, to shift hips forward, shoulders back, and increased step length. Slight improvement with cues for correction. Gait limited by fatigue. Chair brought up behind patient to return to sitting.  -           User Key  (r) = Recorded By, (t) = Taken By, (c) = Cosigned By    Initials Name Provider Type    LR Mariela Zamudio, PT Physical Therapist               Obj/Interventions     Row Name 01/03/23 0914          Motor Skills    Therapeutic Exercise ankle;knee;hip;other (see comments)  cues for technique; no assist required  -     Row Name 01/03/23 0914          Hip (Therapeutic Exercise)    Hip (Therapeutic Exercise) strengthening exercise;isometric exercises  -     Hip Isometrics (Therapeutic Exercise) bilateral;aDduction;gluteal sets;sitting;10 repetitions  hip adduction pillow squeezes  -     Hip Strengthening (Therapeutic Exercise) bilateral;heel slides;aBduction;marching while seated;sitting;10 repetitions  -     Row Name 01/03/23 0914          Knee (Therapeutic Exercise)    Knee (Therapeutic Exercise) strengthening exercise;isometric exercises  -     Knee Isometrics (Therapeutic Exercise) bilateral;quad sets;sitting;10 repetitions  -     Knee Strengthening (Therapeutic Exercise) bilateral;SLR (straight leg raise);LAQ (long arc quad);sitting;10 repetitions  -     Row Name 01/03/23 0914          Ankle (Therapeutic Exercise)    Ankle (Therapeutic Exercise) AROM (active range of motion);PROM (passive range of motion)  -     Ankle AROM (Therapeutic Exercise) left;dorsiflexion;plantarflexion;sitting;10 repetitions  -     Ankle PROM (Therapeutic Exercise) right;dorsiflexion;plantarflexion;sitting;10 repetitions  -     Row Name 01/03/23 0914          Balance    Balance Assessment sitting static balance;sitting dynamic balance;standing static  balance;standing dynamic balance  -LR     Static Sitting Balance standby assist  -LR     Dynamic Sitting Balance contact guard  -LR     Position, Sitting Balance unsupported;sitting edge of bed  -LR     Static Standing Balance contact guard  -LR     Dynamic Standing Balance contact guard;1 person to manage equipment  -LR     Position/Device Used, Standing Balance supported;walker, rolling  -LR           User Key  (r) = Recorded By, (t) = Taken By, (c) = Cosigned By    Initials Name Provider Type    LR Mariela Zamudio, PT Physical Therapist               Goals/Plan     Row Name 01/03/23 0914          Bed Mobility Goal 1 (PT)    Activity/Assistive Device (Bed Mobility Goal 1, PT) sit to supine/supine to sit  -LR     [place] Level/Cues Needed (Bed Mobility Goal 1, PT) standby assist  -LR     Time Frame (Bed Mobility Goal 1, PT) long term goal (LTG);2 weeks  -LR     Progress/Outcomes (Bed Mobility Goal 1, PT) goal ongoing;continuing progress toward goal  -LR     Row Name 01/03/23 0914          Transfer Goal 1 (PT)    Activity/Assistive Device (Transfer Goal 1, PT) bed-to-chair/chair-to-bed;walker, rolling;sit-to-stand/stand-to-sit  -LR     [place] Level/Cues Needed (Transfer Goal 1, PT) standby assist  -LR     Time Frame (Transfer Goal 1, PT) long term goal (LTG);2 weeks  -LR     Progress/Outcome (Transfer Goal 1, PT) continuing progress toward goal;goal ongoing  -LR     Row Name 01/03/23 0914          Gait Training Goal 1 (PT)    Activity/Assistive Device (Gait Training Goal 1, PT) gait (walking locomotion);assistive device use  -LR     [place] Level (Gait Training Goal 1, PT) standby assist  -LR     Distance (Gait Training Goal 1, PT) 150 feet  -LR     Time Frame (Gait Training Goal 1, PT) long term goal (LTG);2 weeks  -LR     Progress/Outcome (Gait Training Goal 1, PT) continuing progress toward goal;goal ongoing  -LR           User Key  (r) = Recorded By, (t) = Taken By, (c) = Cosigned By     Initials Name Provider Type    LR Mariela Zamudio, PT Physical Therapist               Clinical Impression     Row Name 01/03/23 0914          Pain    Pretreatment Pain Rating 0/10 - no pain  -LR     Posttreatment Pain Rating 0/10 - no pain  -LR     Pain Intervention(s) Ambulation/increased activity;Repositioned  -LR     Row Name 01/03/23 0914          Plan of Care Review    Plan of Care Reviewed With patient  -LR     Progress improving  -LR     Outcome Evaluation Patient increased gait distance to 75 feet with increased independence, only requiring CGA. Gait limited by fatigue. Min assist x2 to stand, mod assist x2 to t/f supine to sit. Good effort with LE ther ex, no assist required. Recommend SNF at d/c. Will continue to progress as able.  -LR     Row Name 01/03/23 0914          Therapy Assessment/Plan (PT)    Patient/Family Therapy Goals Statement (PT) get better  -LR     Rehab Potential (PT) good, to achieve stated therapy goals  -LR     Criteria for Skilled Interventions Met (PT) yes;meets criteria;skilled treatment is necessary  -LR     Therapy Frequency (PT) daily  -LR     Row Name 01/03/23 0914          Vital Signs    Intra Systolic BP Rehab 181  -LR     Intra Treatment Diastolic BP 85  -LR     Intra Patient Position Sitting  -LR     Row Name 01/03/23 0914          Positioning and Restraints    Pre-Treatment Position in bed  -LR     Post Treatment Position chair  -LR     In Chair notified nsg;reclined;sitting;call light within reach;encouraged to call for assist;exit alarm on;legs elevated;waffle cushion;on mechanical lift sling  -LR           User Key  (r) = Recorded By, (t) = Taken By, (c) = Cosigned By    Initials Name Provider Type    LR Mariela Zamudio, PT Physical Therapist               Outcome Measures     Row Name 01/03/23 0914 01/03/23 0830       How much help from another person do you currently need...    Turning from your back to your side while in flat bed without using bedrails?  3  -LR 3  -MF    Moving from lying on back to sitting on the side of a flat bed without bedrails? 2  -LR 2  -MF    Moving to and from a bed to a chair (including a wheelchair)? 2  -LR 2  -MF    Standing up from a chair using your arms (e.g., wheelchair, bedside chair)? 2  -LR 2  -MF    Climbing 3-5 steps with a railing? 1  -LR 1  -MF    To walk in hospital room? 3  -LR 2  -MF    AM-PAC 6 Clicks Score (PT) 13  -LR 12  -MF    Highest level of mobility 4 --> Transferred to chair/commode  -LR 4 --> Transferred to chair/commode  -MF    Row Name 01/03/23 0951 01/03/23 0914       Functional Assessment    Outcome Measure Options AM-PAC 6 Clicks Daily Activity (OT)  -MR AM-PAC 6 Clicks Basic Mobility (PT)  -LR          User Key  (r) = Recorded By, (t) = Taken By, (c) = Cosigned By    Initials Name Provider Type    LR Mariela Zamudio, PT Physical Therapist    Nicolasa Aponte, OT Occupational Therapist    Bam Panchal, RN Registered Nurse                             Physical Therapy Education     Title: PT OT SLP Therapies (In Progress)     Topic: Physical Therapy (Done)     Point: Mobility training (Done)     Learning Progress Summary           Patient Acceptance, E,D, VU,NR by LR at 1/3/2023 0914    Comment: Educated on benefits of mobility and being OOB. Educated on safety with mobility, correct supine to sit t/f technique, correct sit<->stand t/f technique, correct gait mechanics, LE HEP, and progression of POC.    Acceptance, E, NR by AS at 1/2/2023 1031    Acceptance, E,TB, VU by JH at 1/1/2023 1522    Comment: Reviewed safety with mobility    Eager, E, VU,NR by SJ at 12/31/2022 1520    Acceptance, E, VU,NR by LM at 12/28/2022 1608   Family Eager, E, VU,NR by SJ at 12/31/2022 1520                   Point: Home exercise program (Done)     Learning Progress Summary           Patient Acceptance, E,D, VU,NR by LR at 1/3/2023 0914    Comment: Educated on benefits of mobility and being OOB. Educated on safety  with mobility, correct supine to sit t/f technique, correct sit<->stand t/f technique, correct gait mechanics, LE HEP, and progression of POC.    Acceptance, E, NR by AS at 1/2/2023 1031    Acceptance, E,TB, VU by  at 1/1/2023 1522    Comment: Reviewed safety with mobility                   Point: Precautions (Done)     Learning Progress Summary           Patient Acceptance, E,D, VU,NR by LR at 1/3/2023 0914    Comment: Educated on benefits of mobility and being OOB. Educated on safety with mobility, correct supine to sit t/f technique, correct sit<->stand t/f technique, correct gait mechanics, LE HEP, and progression of POC.    Acceptance, E, NR by AS at 1/2/2023 1031    Acceptance, E,TB, VU by  at 1/1/2023 1522    Comment: Reviewed safety with mobility    Eager, E, VU,NR by  at 12/31/2022 1520    Acceptance, E, VU,NR by  at 12/28/2022 1608   Family Eager, E, VU,NR by  at 12/31/2022 1520                               User Key     Initials Effective Dates Name Provider Type Discipline     06/16/21 -  Tessa Jackson, PT Physical Therapist PT    LR 06/16/21 -  Mariela Zamudio, PT Physical Therapist PT    AS 06/16/21 -  Teresa Niño, PTA Physical Therapist Assistant PT     06/16/21 -  Nicky Bennett, PT Physical Therapist PT     09/22/20 -  Duran Grant, PT Physical Therapist PT              PT Recommendation and Plan     Plan of Care Reviewed With: patient  Progress: improving  Outcome Evaluation: Patient increased gait distance to 75 feet with increased independence, only requiring CGA. Gait limited by fatigue. Min assist x2 to stand, mod assist x2 to t/f supine to sit. Good effort with LE ther ex, no assist required. Recommend SNF at d/c. Will continue to progress as able.     Time Calculation:    PT Charges     Row Name 01/03/23 0914             Time Calculation    Start Time 0914  -LR      PT Received On 01/03/23  -      PT Goal Re-Cert Due Date 01/07/23  -LR         Timed  Charges    28753 - PT Therapeutic Exercise Minutes 10  -LR      97268 - Gait Training Minutes  10  -LR      29651 - PT Therapeutic Activity Minutes 5  -LR         Total Minutes    Timed Charges Total Minutes 25  -LR       Total Minutes 25  -LR            User Key  (r) = Recorded By, (t) = Taken By, (c) = Cosigned By    Initials Name Provider Type    LR Mariela Zamudio, PT Physical Therapist              Therapy Charges for Today     Code Description Service Date Service Provider Modifiers Qty    71151451259 HC PT THER PROC EA 15 MIN 1/3/2023 Mariela Zamudio, PT GP 1    30954282038 HC GAIT TRAINING EA 15 MIN 1/3/2023 Mariela Zamudio, PT GP 1          PT G-Codes  Outcome Measure Options: AM-PAC 6 Clicks Daily Activity (OT)  AM-PAC 6 Clicks Score (PT): 13  AM-PAC 6 Clicks Score (OT): 12  PT Discharge Summary  Anticipated Discharge Disposition (PT): skilled nursing facility    Mariela Zamudio, PT  1/3/2023

## 2023-01-03 NOTE — PLAN OF CARE
Goal Outcome Evaluation:  Plan of Care Reviewed With: patient        Progress: improving  Outcome Evaluation: Patient increased gait distance to 75 feet with increased independence, only requiring CGA. Gait limited by fatigue. Min assist x2 to stand, mod assist x2 to t/f supine to sit. Good effort with LE ther ex, no assist required. Recommend SNF at d/c. Will continue to progress as able.